# Patient Record
Sex: FEMALE | Race: WHITE | NOT HISPANIC OR LATINO | Employment: FULL TIME | URBAN - METROPOLITAN AREA
[De-identification: names, ages, dates, MRNs, and addresses within clinical notes are randomized per-mention and may not be internally consistent; named-entity substitution may affect disease eponyms.]

---

## 2017-01-09 ENCOUNTER — GENERIC CONVERSION - ENCOUNTER (OUTPATIENT)
Dept: OTHER | Facility: OTHER | Age: 57
End: 2017-01-09

## 2017-01-27 ENCOUNTER — ALLSCRIPTS OFFICE VISIT (OUTPATIENT)
Dept: OTHER | Facility: OTHER | Age: 57
End: 2017-01-27

## 2017-01-27 DIAGNOSIS — M25.511 PAIN IN RIGHT SHOULDER: ICD-10-CM

## 2017-02-21 ENCOUNTER — TRANSCRIBE ORDERS (OUTPATIENT)
Dept: ADMINISTRATIVE | Facility: HOSPITAL | Age: 57
End: 2017-02-21

## 2017-02-21 ENCOUNTER — HOSPITAL ENCOUNTER (OUTPATIENT)
Dept: MRI IMAGING | Facility: HOSPITAL | Age: 57
Discharge: HOME | End: 2017-02-21
Attending: ORTHOPAEDIC SURGERY
Payer: COMMERCIAL

## 2017-02-21 DIAGNOSIS — M25.511 PAIN IN RIGHT SHOULDER: ICD-10-CM

## 2017-02-21 PROCEDURE — 73221 MRI JOINT UPR EXTREM W/O DYE: CPT

## 2017-03-06 ENCOUNTER — ALLSCRIPTS OFFICE VISIT (OUTPATIENT)
Dept: OTHER | Facility: OTHER | Age: 57
End: 2017-03-06

## 2017-11-16 ENCOUNTER — TRANSCRIBE ORDERS (OUTPATIENT)
Dept: ADMINISTRATIVE | Facility: HOSPITAL | Age: 57
End: 2017-11-16

## 2017-11-16 DIAGNOSIS — Z12.31 VISIT FOR SCREENING MAMMOGRAM: Primary | ICD-10-CM

## 2017-11-24 ENCOUNTER — HOSPITAL ENCOUNTER (OUTPATIENT)
Dept: RADIOLOGY | Facility: HOSPITAL | Age: 57
Discharge: HOME | End: 2017-11-24
Attending: OBSTETRICS & GYNECOLOGY
Payer: COMMERCIAL

## 2017-11-24 DIAGNOSIS — Z12.31 VISIT FOR SCREENING MAMMOGRAM: ICD-10-CM

## 2017-11-24 PROCEDURE — G0202 SCR MAMMO BI INCL CAD: HCPCS

## 2018-01-11 NOTE — MISCELLANEOUS
Message   Recorded as Task   Date: 10/14/2016 11:26 AM, Created By: Emilie Mota   Task Name: Miscellaneous   Assigned To: Suzie Ledezma   Regarding Patient: Quinn Murphy, Status: Active   CommentGregg Nair - 14 Oct 2016 11:26 AM     TASK CREATED  Jayda returned your call and said she is unable to move her appointment  Please call her back [phone] or [phone]   Pinky Bean - 17 Oct 2016 11:17 AM     TASK REPLIED TO: Previously Assigned To Pinky Bean  patient has been rescheduled  Thank you        Active Problems    1  Acute bronchitis (466 0) (J20 9)   2  Allergic rhinitis (477 9) (J30 9)   3  Anal fissure (565 0) (K60 2)   4  Anemia (285 9) (D64 9)   5  Anxiety disorder (300 00) (F41 9)   6  Arthralgia (719 40)   7  Arthritis (716 90) (M19 90)   8  Arthropathy (716 90) (M12 9)   9  Cervical disc disorder with radiculopathy of mid-cervical region (723 4) (M50 120)   10  Cholelithiasis (574 20) (K80 20)   11  Chronic neck pain (723 1,338 29) (M54 2,G89 29)   12  Chronic pain syndrome (338 4) (G89 4)   13  Chronic sinusitis (473 9) (J32 9)   14  Conjunctivitis (372 30) (H10 9)   15  Cough (786 2) (R05)   16  Diarrhea (787 91) (R19 7)   17  Difficulty breathing (786 09) (R06 89)   18  Dizziness (780 4) (R42)   19  Encounter for routine gynecological examination with Papanicolaou smear of cervix    (V72 31,V76 2) (Z01 419)   20  Encounter for screening mammogram for malignant neoplasm of breast (V76 12)    (Z12 31)   21  Endometriosis (617 9) (N80 9)   22  External Hemorrhoids (455 3)   23  Fatigue (780 79) (R53 83)   24  Fatigue (780 79) (R53 83)   25  Fibromyalgia (729 1) (M79 7)   26  Headache (784 0) (R51)   27  Heart burn (787 1) (R12)   28  Hemorrhoids (455 6) (K64 9)   29  Hyperlipidemia (272 4) (E78 5)   30  Impaired fasting glucose (790 21) (R73 01)   31  Impingement syndrome of right shoulder (726 2) (M75 41)   32  Malaise (780 79) (R53 81)   33   Multiple joint pain (719 49) (M25 50)   34  Multiple joint pain (719 49) (M25 50)   35  Myalgia And Myositis (729 1)   36  History of Neuroplasty Decompression Median Nerve At Carpal Tunnel   37  Nicotine dependence (305 1) (F17 200)   38  Osteopenia (733 90) (M85 80)   39  Pruritus ani (698 0) (L29 0)   40  Right shoulder pain (719 41) (M25 511)   41  Screening for diabetes mellitus (V77 1) (Z13 1)   42  Screening for osteoporosis (V82 81) (Z13 820)   43  Screening for thyroid disorder (V77 0) (Z13 29)   44  Sinusitis (473 9) (J32 9)   45  Subacromial bursitis, right (726 19) (M75 51)   46  Urinary tract infection (599 0) (N39 0)   47  Vitamin D deficiency (268 9) (E55 9)    Current Meds   1  Fluconazole 150 MG Oral Tablet; TAKE 1 TABLET DAILY; Therapy: 12ULD1998 to (Evaluate:90Ceo3723)  Requested for: 59Cvo6048; Last   Rx:60Xlj4797 Ordered   2  Fluticasone Propionate 50 MCG/ACT Nasal Suspension; USE 2 SPRAYS IN EACH   NOSTRIL ONCE DAILY; Therapy: 81OGN9980 to (Evaluate:16Mar2015)  Requested for: 94HUF6051; Last   Rx:12Akc0841 Ordered   3  Ibuprofen 200 MG Oral Capsule; TAKE 1 CAPSULE EVERY 4 TO 6 HOURS PRN   Recorded   4  Naproxen 500 MG Oral Tablet (Naprosyn); TAKE 1 TABLET EVERY 12 HOURS DAILY    Requested for: 91CAM4520; Last Rx:27Mar2015 Ordered   5  Rizatriptan Benzoate 10 MG Oral Tablet (Maxalt); TAKE ONE TABLET BY MOUTH AT   ONSET OF HEADACHE REPEAT IN 2 HOURS AS NEEDED MAXIMUM OF 2 DAILY; Therapy: 79XJR7793 to (Evaluate:24Jan2016)  Requested for: 08NOU0158; Last   Rx:26Oct2015 Ordered    Allergies    1  CeleBREX CAPS    2  Seasonal    Signatures   Electronically signed by :  Lluvia Sims, ; Oct 17 2016 12:26PM EST                       (Author)

## 2018-01-12 NOTE — MISCELLANEOUS
Message   Recorded as Task   Date: 01/04/2017 01:43 PM, Created By: Elvin Alvarado   Task Name: Follow Up   Assigned To: [address] Procedure,Team   Regarding Patient: Naomi Hampton, Status: Active   Comment:    Marcie Aldana - 04 Jan 2017 1:43 PM     TASK CREATED  L/m to call  S/p C7-T1 ROSI done 12/29/16 by Dr Gerianne Goltz Rivera,Jully - 09 Jan 2017 11:55 AM     TASK EDITED  Patient reports she received 80% relief from her procedure  Her pain level today is a 2  S/p C7-T1-ROSI done 12/29/16  Clearnce Lei - 09 Jan 2017 11:58 AM     TASK REPLIED TO: Previously Assigned To [address] Procedure,Team  Aware  Thanks  Active Problems    1  Acute bronchitis (466 0) (J20 9)   2  Allergic rhinitis (477 9) (J30 9)   3  Anal fissure (565 0) (K60 2)   4  Anemia (285 9) (D64 9)   5  Anxiety disorder (300 00) (F41 9)   6  Arthralgia (719 40)   7  Arthritis (716 90) (M19 90)   8  Arthropathy (716 90) (M12 9)   9  Cervical disc disorder with radiculopathy of mid-cervical region (723 4) (M50 120)   10  Cholelithiasis (574 20) (K80 20)   11  Chronic neck pain (723 1,338 29) (M54 2,G89 29)   12  Chronic pain syndrome (338 4) (G89 4)   13  Chronic sinusitis (473 9) (J32 9)   14  Conjunctivitis (372 30) (H10 9)   15  Cough (786 2) (R05)   16  Diarrhea (787 91) (R19 7)   17  Difficulty breathing (786 09) (R06 89)   18  Dizziness (780 4) (R42)   19  Encounter for routine gynecological examination with Papanicolaou smear of cervix    (V72 31,V76 2) (Z01 419)   20  Encounter for screening mammogram for malignant neoplasm of breast (V76 12)    (Z12 31)   21  Endometriosis (617 9) (N80 9)   22  External Hemorrhoids (455 3)   23  Fatigue (780 79) (R53 83)   24  Fatigue (780 79) (R53 83)   25  Fibromyalgia (729 1) (M79 7)   26  Headache (784 0) (R51)   27  Heart burn (787 1) (R12)   28  Hemorrhoids (455 6) (K64 9)   29  Hyperlipidemia (272 4) (E78 5)   30  Impaired fasting glucose (790 21) (R73 01)   31   Impingement syndrome of right shoulder (726 2) (M75 41)   32  Malaise (780 79) (R53 81)   33  Multiple joint pain (719 49) (M25 50)   34  Multiple joint pain (719 49) (M25 50)   35  Myalgia And Myositis (729 1)   36  History of Neuroplasty Decompression Median Nerve At Carpal Tunnel   37  Nicotine dependence (305 1) (F17 200)   38  Osteopenia (733 90) (M85 80)   39  Pruritus ani (698 0) (L29 0)   40  Right shoulder pain (719 41) (M25 511)   41  Screening for diabetes mellitus (V77 1) (Z13 1)   42  Screening for osteoporosis (V82 81) (Z13 820)   43  Screening for thyroid disorder (V77 0) (Z13 29)   44  Sinusitis (473 9) (J32 9)   45  Subacromial bursitis, right (726 19) (M75 51)   46  Urinary tract infection (599 0) (N39 0)   47  Vitamin D deficiency (268 9) (E55 9)    Current Meds   1  Fluconazole 150 MG Oral Tablet; TAKE 1 TABLET DAILY; Therapy: 66PDX6859 to (Evaluate:92Zce0275)  Requested for: 69Qkm2426; Last   Rx:55Ihf1106 Ordered   2  Fluticasone Propionate 50 MCG/ACT Nasal Suspension; USE 2 SPRAYS IN EACH   NOSTRIL ONCE DAILY; Therapy: 12YQG3194 to (Evaluate:16Mar2015)  Requested for: 66BQL1083; Last   Rx:72Jgd8078 Ordered   3  Ibuprofen 200 MG Oral Capsule; TAKE 1 CAPSULE EVERY 4 TO 6 HOURS PRN   Recorded   4  Naproxen 500 MG Oral Tablet (Naprosyn); TAKE 1 TABLET EVERY 12 HOURS DAILY    Requested for: 57TGW4781; Last Rx:27Mar2015 Ordered   5  Rizatriptan Benzoate 10 MG Oral Tablet (Maxalt); TAKE ONE TABLET BY MOUTH AT   ONSET OF HEADACHE REPEAT IN 2 HOURS AS NEEDED MAXIMUM OF 2 DAILY; Therapy: 21NJB7221 to (Evaluate:24Jan2016)  Requested for: 90YZP4816; Last   Rx:26Oct2015 Ordered    Allergies    1   CeleBREX CAPS    2  Seasonal    Signatures   Electronically signed by : Ashley Islas MA; Jan 9 2017  3:53PM EST                       (Author)

## 2018-01-13 NOTE — MISCELLANEOUS
Message   Recorded as Task   Date: 11/09/2016 01:44 PM, Created By: Toma Prado   Task Name: Follow Up   Assigned To: Rody Bowens   Regarding Patient: Thony Johnson, Status: In Progress   Comment:    Marcie Aldana - 09 Nov 2016 1:44 PM     TASK CREATED  L/m to return call  S/p C7-T1-ROSI done 11/2/16 By Marcie Nam - 10 Nov 2016 1:47 PM     TASK EDITED  Patrient l/m stating that she received 50% relief fromher procedue  She would like to repeat the injection  S/p  C7-T1-ROSI done 11/2/16  Chava Johnson - 10 Nov 2016 2:31 PM     TASK REPLIED TO: Previously Assigned To Chava José Miguelon  Please have patient come in for a repeat procedure at least 3 weeks from the date of the last procedure  Marcie Aldana - 16 Nov 2016 2:28 PM     TASK EDITED     L/m to return call  S/p  C7-T1-ROSI done 11/2/16 by Marcie Nam - 16 Nov 2016 3:04 PM     TASK EDITED   Marcie Aldana - 23 Nov 2016 2:43 PM     TASK EDITED  Please mail letter  Letter Sent      Active Problems    1  Acute bronchitis (466 0) (J20 9)   2  Allergic rhinitis (477 9) (J30 9)   3  Anal fissure (565 0) (K60 2)   4  Anemia (285 9) (D64 9)   5  Anxiety disorder (300 00) (F41 9)   6  Arthralgia (719 40)   7  Arthritis (716 90) (M19 90)   8  Arthropathy (716 90) (M12 9)   9  Cervical disc disorder with radiculopathy of mid-cervical region (723 4) (M50 120)   10  Cholelithiasis (574 20) (K80 20)   11  Chronic neck pain (723 1,338 29) (M54 2,G89 29)   12  Chronic pain syndrome (338 4) (G89 4)   13  Chronic sinusitis (473 9) (J32 9)   14  Conjunctivitis (372 30) (H10 9)   15  Cough (786 2) (R05)   16  Diarrhea (787 91) (R19 7)   17  Difficulty breathing (786 09) (R06 89)   18  Dizziness (780 4) (R42)   19  Encounter for routine gynecological examination with Papanicolaou smear of cervix    (V72 31,V76 2) (Z01 419)   20  Encounter for screening mammogram for malignant neoplasm of breast (V76 12)    (Z12 31)   21   Endometriosis (617 9) (N80 9)   22  External Hemorrhoids (455 3)   23  Fatigue (780 79) (R53 83)   24  Fatigue (780 79) (R53 83)   25  Fibromyalgia (729 1) (M79 7)   26  Headache (784 0) (R51)   27  Heart burn (787 1) (R12)   28  Hemorrhoids (455 6) (K64 9)   29  Hyperlipidemia (272 4) (E78 5)   30  Impaired fasting glucose (790 21) (R73 01)   31  Impingement syndrome of right shoulder (726 2) (M75 41)   32  Malaise (780 79) (R53 81)   33  Multiple joint pain (719 49) (M25 50)   34  Multiple joint pain (719 49) (M25 50)   35  Myalgia And Myositis (729 1)   36  History of Neuroplasty Decompression Median Nerve At Carpal Tunnel   37  Nicotine dependence (305 1) (F17 200)   38  Osteopenia (733 90) (M85 80)   39  Pruritus ani (698 0) (L29 0)   40  Right shoulder pain (719 41) (M25 511)   41  Screening for diabetes mellitus (V77 1) (Z13 1)   42  Screening for osteoporosis (V82 81) (Z13 820)   43  Screening for thyroid disorder (V77 0) (Z13 29)   44  Sinusitis (473 9) (J32 9)   45  Subacromial bursitis, right (726 19) (M75 51)   46  Urinary tract infection (599 0) (N39 0)   47  Vitamin D deficiency (268 9) (E55 9)    Current Meds   1  Fluconazole 150 MG Oral Tablet; TAKE 1 TABLET DAILY; Therapy: 19ZOJ3242 to (Evaluate:62Aql9558)  Requested for: 84Jeg1973; Last   Rx:87Hxg3025 Ordered   2  Fluticasone Propionate 50 MCG/ACT Nasal Suspension; USE 2 SPRAYS IN EACH   NOSTRIL ONCE DAILY; Therapy: 72VQF6910 to (Evaluate:16Mar2015)  Requested for: 24UFI6665; Last   Rx:95Uyk2961 Ordered   3  Ibuprofen 200 MG Oral Capsule; TAKE 1 CAPSULE EVERY 4 TO 6 HOURS PRN   Recorded   4  Naproxen 500 MG Oral Tablet (Naprosyn); TAKE 1 TABLET EVERY 12 HOURS DAILY    Requested for: 27VYK2132; Last Rx:27Mar2015 Ordered   5  Rizatriptan Benzoate 10 MG Oral Tablet (Maxalt); TAKE ONE TABLET BY MOUTH AT   ONSET OF HEADACHE REPEAT IN 2 HOURS AS NEEDED MAXIMUM OF 2 DAILY; Therapy: 74JXL8454 to (Evaluate:24Jan2016)  Requested for: 00MWT2073;  Last   Rx:26Oct2015 Ordered    Allergies    1   CeleBREX CAPS    2  Seasonal    Signatures   Electronically signed by : Erich Balderrama, ; Nov 30 2016 10:13AM EST                       (Author)

## 2018-06-28 ENCOUNTER — HOSPITAL ENCOUNTER (OUTPATIENT)
Dept: RADIOLOGY | Facility: HOSPITAL | Age: 58
Discharge: HOME | End: 2018-06-28
Attending: FAMILY MEDICINE
Payer: COMMERCIAL

## 2018-06-28 ENCOUNTER — TRANSCRIBE ORDERS (OUTPATIENT)
Dept: ADMINISTRATIVE | Facility: HOSPITAL | Age: 58
End: 2018-06-28

## 2018-06-28 DIAGNOSIS — M25.50 PAIN IN JOINT, MULTIPLE SITES: Primary | ICD-10-CM

## 2018-06-28 DIAGNOSIS — M25.50 PAIN IN JOINT, MULTIPLE SITES: ICD-10-CM

## 2018-06-28 PROCEDURE — 73502 X-RAY EXAM HIP UNI 2-3 VIEWS: CPT

## 2018-07-06 ENCOUNTER — OFFICE VISIT (OUTPATIENT)
Dept: OBGYN CLINIC | Facility: CLINIC | Age: 58
End: 2018-07-06
Payer: COMMERCIAL

## 2018-07-06 VITALS
BODY MASS INDEX: 24.33 KG/M2 | WEIGHT: 155 LBS | HEIGHT: 67 IN | HEART RATE: 64 BPM | DIASTOLIC BLOOD PRESSURE: 65 MMHG | SYSTOLIC BLOOD PRESSURE: 103 MMHG

## 2018-07-06 DIAGNOSIS — M25.551 RIGHT HIP PAIN: ICD-10-CM

## 2018-07-06 DIAGNOSIS — M70.61 TROCHANTERIC BURSITIS OF RIGHT HIP: Primary | ICD-10-CM

## 2018-07-06 PROCEDURE — 99214 OFFICE O/P EST MOD 30 MIN: CPT | Performed by: ORTHOPAEDIC SURGERY

## 2018-07-06 RX ORDER — RIZATRIPTAN BENZOATE 10 MG/1
10 TABLET ORAL
COMMUNITY

## 2018-07-06 NOTE — PROGRESS NOTES
Assessment/Plan:  1  Trochanteric bursitis of right hip  Ambulatory referral to Physical Therapy    diclofenac sodium (VOLTAREN) 1 %   2  Right hip pain  Ambulatory referral to Physical Therapy    diclofenac sodium (VOLTAREN) 1 %     Jayda is a very pleasant 81-year-old female presenting for evaluation of right hip pain consistent with greater trochanteric bursitis  She does not have significant arthritis of her hip on x-ray and is asymptomatic  She does have lumbar spine spondylosis, but focal pain over the greater trochanter and IT band consistent with trochanteric bursitis  We discussed these findings with her and have recommended conservative treatment  She was referred to formal physical therapy and given a prescription for Voltaren gel that she can use up to 4 times a day on the affected hip  She does not have a true allergy to Celebrex and has been able to tolerate Naprosyn  However, she was educated about potential side effects of Voltaren gel and that she should discontinue if she begins to notice palpitations after using it  We would like to see her back in 6 weeks for clinical re-evaluation  She will not need new x-rays at that time  All of her questions were answered in the office today  Subjective: Right hip pain    Patient ID: Opal Cespedes is a 63 y o  female  Meagan Martinez is a very pleasant 81-year-old female presenting for 3 months of atraumatic right hip pain  She does have a history of cervical and lumbar spine degeneration for which she sees a chiropractor  However, she has noted lateral right hip pain without any groin pain or paresthesias in the lower extremities  She denies any injury history to the right hip  She does work as a  and does a significant amount of driving and exercise in her free time  She has tried an occasional Naprosyn, which has provided some relief  She denies any exacerbating or alleviating factors  She has tried ice but no Tylenol    She is tender at a cortisone injection or physical therapy for this hip  She does occasionally feel pain radiating down the lateral right leg towards the knee  Overall, she is in good health and lives an active lifestyle  Review of Systems   Constitutional: Negative  HENT: Negative  Eyes: Negative  Respiratory: Negative  Cardiovascular: Negative  Gastrointestinal: Negative  Endocrine: Negative  Genitourinary: Positive for hematuria  Musculoskeletal: Positive for arthralgias and myalgias  Skin: Positive for rash  Allergic/Immunologic: Negative  Neurological: Negative  Hematological: Negative  Psychiatric/Behavioral: Positive for decreased concentration  The patient is nervous/anxious  Past Medical History:   Diagnosis Date    Neck pain     Osteopenia     Seasonal allergic reaction        Past Surgical History:   Procedure Laterality Date    CHOLECYSTECTOMY      laparoscopy    EPIDURAL BLOCK INJECTION N/A 12/29/2016    Procedure: BLOCK / INJECTION EPIDURAL STEROID CERVICAL  C7-T1;  Surgeon: Srinivas Echevarria MD;  Location: [facility] MAIN OR;  Service:     EPIDURAL BLOCK INJECTION N/A 11/2/2016    Procedure: BLOCK / INJECTION EPIDURAL STEROID CERVICAL  C7-T1;  Surgeon: Srinivas Echevarria MD;  Location: [facility] MAIN OR;  Service:     FRACTURE SURGERY      right ankle    HYSTERECTOMY         History reviewed  No pertinent family history  Social History     Occupational History    Not on file       Social History Main Topics    Smoking status: Current Every Day Smoker     Packs/day: 0 25     Years: 40 00    Smokeless tobacco: Never Used    Alcohol use Yes      Comment: social    Drug use: No    Sexual activity: Not on file         Current Outpatient Prescriptions:     naproxen (NAPROSYN) 500 mg tablet, Take 500 mg by mouth 2 (two) times a day with meals, Disp: , Rfl:     rizatriptan (MAXALT) 10 MG tablet, Take 10 mg by mouth, Disp: , Rfl:     diclofenac sodium (VOLTAREN) 1 %, Apply 2 g topically 4 (four) times a day, Disp: 1 Tube, Rfl: 1    Allergies   Allergen Reactions    Percocet [Oxycodone-Acetaminophen] GI Intolerance    Celecoxib Palpitations       Objective:  Vitals:    07/06/18 0815   BP: 103/65   Pulse: 64       Body mass index is 24 28 kg/m²  Right Hip Exam     Tenderness   The patient is experiencing tenderness in the greater trochanter and lateral     Range of Motion   Extension:  20 normal   Flexion:  130 normal   Internal Rotation:  25 normal   External Rotation:  50 normal   Abduction:  45 normal   Adduction:  25 normal     Muscle Strength   Abduction: 5/5   Adduction: 5/5   Flexion: 5/5     Tests   JOJO: negative  Aden: positive    Other   Erythema: absent  Scars: absent  Sensation: normal  Pulse: present    Comments:  Negative Stingefiel/Impingement/FABERs/log roll  No tenderness lumbar spinous processes or paraspinal muscles  Negative SLR seated and supine bilaterally  Normal sensation to light touch L2-S1  Ambulates with normal, symmetrical gait  Mild tenderness IT band  Positive Aden's  Calf and thigh soft and non tender            Physical Exam   Constitutional: She is oriented to person, place, and time  She appears well-developed and well-nourished  Body mass index is 24 28 kg/m²  HENT:   Head: Normocephalic and atraumatic  Eyes: EOM are normal    Neck: Normal range of motion  Cardiovascular: Intact distal pulses  Pulmonary/Chest: Effort normal    Musculoskeletal:   See ortho exam   Neurological: She is alert and oriented to person, place, and time  Skin: Skin is warm and dry  Psychiatric: She has a normal mood and affect  Her behavior is normal  Judgment and thought content normal        I have personally reviewed pertinent films in PACS of the weightbearing X-rays of her pelvis and right hip which show mild narrowing of the femoroacetabular joint on the right  There is no sclerosis, subchondral cyst, fracture, dislocation, or calcification  What's seen of the lumbar spine demonstrates significant spondylosis at L5-S1 and mild at L4-L5

## 2018-09-18 ENCOUNTER — TRANSCRIBE ORDERS (OUTPATIENT)
Dept: ADMINISTRATIVE | Facility: HOSPITAL | Age: 58
End: 2018-09-18

## 2018-09-18 ENCOUNTER — APPOINTMENT (OUTPATIENT)
Dept: LAB | Facility: HOSPITAL | Age: 58
End: 2018-09-18
Attending: FAMILY MEDICINE
Payer: COMMERCIAL

## 2018-09-18 ENCOUNTER — HOSPITAL ENCOUNTER (OUTPATIENT)
Dept: RADIOLOGY | Facility: HOSPITAL | Age: 58
Discharge: HOME | End: 2018-09-18
Attending: FAMILY MEDICINE
Payer: COMMERCIAL

## 2018-09-18 DIAGNOSIS — J02.9 ACUTE PHARYNGITIS, UNSPECIFIED ETIOLOGY: Primary | ICD-10-CM

## 2018-09-18 DIAGNOSIS — R05.9 COUGH: ICD-10-CM

## 2018-09-18 DIAGNOSIS — M25.50 PAIN IN JOINT, MULTIPLE SITES: ICD-10-CM

## 2018-09-18 DIAGNOSIS — R06.02 SOB (SHORTNESS OF BREATH): ICD-10-CM

## 2018-09-18 DIAGNOSIS — J02.9 ACUTE PHARYNGITIS, UNSPECIFIED ETIOLOGY: ICD-10-CM

## 2018-09-18 LAB
BASOPHILS # BLD AUTO: 0.09 THOUSANDS/ΜL (ref 0–0.1)
BASOPHILS NFR BLD AUTO: 2 % (ref 0–1)
EOSINOPHIL # BLD AUTO: 0 THOUSAND/ΜL (ref 0–0.61)
EOSINOPHIL NFR BLD AUTO: 0 % (ref 0–6)
ERYTHROCYTE [DISTWIDTH] IN BLOOD BY AUTOMATED COUNT: 11.5 % (ref 11.6–15.1)
ERYTHROCYTE [SEDIMENTATION RATE] IN BLOOD: 8 MM/HOUR (ref 2–25)
HCT VFR BLD AUTO: 39.4 % (ref 34.8–46.1)
HGB BLD-MCNC: 12.8 G/DL (ref 11.5–15.4)
IMM GRANULOCYTES # BLD AUTO: 0.02 THOUSAND/UL (ref 0–0.2)
IMM GRANULOCYTES NFR BLD AUTO: 0 % (ref 0–2)
LYMPHOCYTES # BLD AUTO: 2.06 THOUSANDS/ΜL (ref 0.6–4.47)
LYMPHOCYTES NFR BLD AUTO: 33 % (ref 14–44)
MCH RBC QN AUTO: 30.3 PG (ref 26.8–34.3)
MCHC RBC AUTO-ENTMCNC: 32.5 G/DL (ref 31.4–37.4)
MCV RBC AUTO: 93 FL (ref 82–98)
MONOCYTES # BLD AUTO: 0.51 THOUSAND/ΜL (ref 0.17–1.22)
MONOCYTES NFR BLD AUTO: 8 % (ref 4–12)
NEUTROPHILS # BLD AUTO: 3.5 THOUSANDS/ΜL (ref 1.85–7.62)
NEUTS SEG NFR BLD AUTO: 57 % (ref 43–75)
NRBC BLD AUTO-RTO: 0 /100 WBCS
PLATELET # BLD AUTO: 243 THOUSANDS/UL (ref 149–390)
PMV BLD AUTO: 10.4 FL (ref 8.9–12.7)
RBC # BLD AUTO: 4.23 MILLION/UL (ref 3.81–5.12)
WBC # BLD AUTO: 6.18 THOUSAND/UL (ref 4.31–10.16)

## 2018-09-18 PROCEDURE — 85652 RBC SED RATE AUTOMATED: CPT

## 2018-09-18 PROCEDURE — 85025 COMPLETE CBC W/AUTO DIFF WBC: CPT | Performed by: FAMILY MEDICINE

## 2018-09-18 PROCEDURE — 86664 EPSTEIN-BARR NUCLEAR ANTIGEN: CPT

## 2018-09-18 PROCEDURE — 36415 COLL VENOUS BLD VENIPUNCTURE: CPT | Performed by: FAMILY MEDICINE

## 2018-09-18 PROCEDURE — 86663 EPSTEIN-BARR ANTIBODY: CPT

## 2018-09-18 PROCEDURE — 71046 X-RAY EXAM CHEST 2 VIEWS: CPT

## 2018-09-18 PROCEDURE — 86665 EPSTEIN-BARR CAPSID VCA: CPT

## 2018-09-18 PROCEDURE — 87147 CULTURE TYPE IMMUNOLOGIC: CPT

## 2018-09-18 PROCEDURE — 87070 CULTURE OTHR SPECIMN AEROBIC: CPT

## 2018-09-18 PROCEDURE — 86618 LYME DISEASE ANTIBODY: CPT

## 2018-09-19 LAB
B BURGDOR IGG SER IA-ACNC: 0.27
B BURGDOR IGM SER IA-ACNC: 0.46
EBV EA IGG SER-ACNC: >150 U/ML (ref 0–8.9)
EBV NA IGG SER IA-ACNC: 317 U/ML (ref 0–17.9)
EBV PATRN SPEC IB-IMP: ABNORMAL
EBV VCA IGG SER IA-ACNC: 296 U/ML (ref 0–17.9)
EBV VCA IGM SER IA-ACNC: <36 U/ML (ref 0–35.9)

## 2018-09-20 LAB — BACTERIA THROAT CULT: ABNORMAL

## 2018-11-01 ENCOUNTER — TRANSCRIBE ORDERS (OUTPATIENT)
Dept: ADMINISTRATIVE | Facility: HOSPITAL | Age: 58
End: 2018-11-01

## 2018-11-01 DIAGNOSIS — Z12.39 SCREENING BREAST EXAMINATION: Primary | ICD-10-CM

## 2018-11-02 ENCOUNTER — TRANSCRIBE ORDERS (OUTPATIENT)
Dept: ADMINISTRATIVE | Facility: HOSPITAL | Age: 58
End: 2018-11-02

## 2018-11-02 DIAGNOSIS — M81.0 OSTEOPOROSIS, POSTMENOPAUSAL: Primary | ICD-10-CM

## 2018-11-27 ENCOUNTER — HOSPITAL ENCOUNTER (OUTPATIENT)
Dept: RADIOLOGY | Facility: HOSPITAL | Age: 58
Discharge: HOME | End: 2018-11-27
Attending: OBSTETRICS & GYNECOLOGY
Payer: COMMERCIAL

## 2018-11-27 ENCOUNTER — HOSPITAL ENCOUNTER (OUTPATIENT)
Dept: RADIOLOGY | Facility: HOSPITAL | Age: 58
Discharge: HOME | End: 2018-11-27
Attending: FAMILY MEDICINE
Payer: COMMERCIAL

## 2018-11-27 VITALS — BODY MASS INDEX: 24.01 KG/M2 | WEIGHT: 153 LBS | HEIGHT: 67 IN

## 2018-11-27 DIAGNOSIS — Z12.39 SCREENING BREAST EXAMINATION: ICD-10-CM

## 2018-11-27 DIAGNOSIS — M81.0 OSTEOPOROSIS, POSTMENOPAUSAL: ICD-10-CM

## 2018-11-27 PROCEDURE — 77080 DXA BONE DENSITY AXIAL: CPT

## 2018-11-27 PROCEDURE — 77067 SCR MAMMO BI INCL CAD: CPT

## 2019-12-05 ENCOUNTER — TRANSCRIBE ORDERS (OUTPATIENT)
Dept: ADMINISTRATIVE | Facility: HOSPITAL | Age: 59
End: 2019-12-05

## 2019-12-05 DIAGNOSIS — Z12.31 SCREENING MAMMOGRAM FOR HIGH-RISK PATIENT: Primary | ICD-10-CM

## 2020-01-07 ENCOUNTER — TRANSCRIBE ORDERS (OUTPATIENT)
Dept: ADMINISTRATIVE | Facility: HOSPITAL | Age: 60
End: 2020-01-07

## 2020-01-07 DIAGNOSIS — I67.1 INTRACRANIAL ANEURYSM: ICD-10-CM

## 2020-01-07 DIAGNOSIS — G89.29 CHRONIC NONINTRACTABLE HEADACHE, UNSPECIFIED HEADACHE TYPE: Primary | ICD-10-CM

## 2020-01-07 DIAGNOSIS — R51.9 CHRONIC NONINTRACTABLE HEADACHE, UNSPECIFIED HEADACHE TYPE: Primary | ICD-10-CM

## 2020-01-18 ENCOUNTER — HOSPITAL ENCOUNTER (OUTPATIENT)
Dept: MRI IMAGING | Facility: HOSPITAL | Age: 60
Discharge: HOME | End: 2020-01-18
Payer: COMMERCIAL

## 2020-01-18 DIAGNOSIS — G89.29 CHRONIC NONINTRACTABLE HEADACHE, UNSPECIFIED HEADACHE TYPE: ICD-10-CM

## 2020-01-18 DIAGNOSIS — I67.1 INTRACRANIAL ANEURYSM: ICD-10-CM

## 2020-01-18 DIAGNOSIS — R51.9 CHRONIC NONINTRACTABLE HEADACHE, UNSPECIFIED HEADACHE TYPE: ICD-10-CM

## 2020-01-18 PROCEDURE — 76377 3D RENDER W/INTRP POSTPROCES: CPT

## 2020-01-18 PROCEDURE — 70551 MRI BRAIN STEM W/O DYE: CPT

## 2020-01-30 ENCOUNTER — HOSPITAL ENCOUNTER (OUTPATIENT)
Dept: RADIOLOGY | Facility: HOSPITAL | Age: 60
Discharge: HOME | End: 2020-01-30
Attending: FAMILY MEDICINE
Payer: COMMERCIAL

## 2020-01-30 VITALS — WEIGHT: 160 LBS | HEIGHT: 67 IN | BODY MASS INDEX: 25.11 KG/M2

## 2020-01-30 DIAGNOSIS — Z12.31 SCREENING MAMMOGRAM FOR HIGH-RISK PATIENT: ICD-10-CM

## 2020-01-30 PROCEDURE — 77063 BREAST TOMOSYNTHESIS BI: CPT

## 2020-01-30 PROCEDURE — 77067 SCR MAMMO BI INCL CAD: CPT

## 2020-08-06 ENCOUNTER — TELEPHONE (OUTPATIENT)
Dept: OBGYN CLINIC | Facility: CLINIC | Age: 60
End: 2020-08-06

## 2020-08-06 ENCOUNTER — OFFICE VISIT (OUTPATIENT)
Dept: OBGYN CLINIC | Facility: CLINIC | Age: 60
End: 2020-08-06
Payer: COMMERCIAL

## 2020-08-06 ENCOUNTER — APPOINTMENT (OUTPATIENT)
Dept: RADIOLOGY | Facility: CLINIC | Age: 60
End: 2020-08-06
Payer: COMMERCIAL

## 2020-08-06 VITALS
TEMPERATURE: 97 F | BODY MASS INDEX: 25.24 KG/M2 | WEIGHT: 160.8 LBS | HEIGHT: 67 IN | DIASTOLIC BLOOD PRESSURE: 65 MMHG | HEART RATE: 64 BPM | SYSTOLIC BLOOD PRESSURE: 100 MMHG

## 2020-08-06 DIAGNOSIS — M65.331 TRIGGER MIDDLE FINGER OF RIGHT HAND: ICD-10-CM

## 2020-08-06 DIAGNOSIS — M79.641 HAND PAIN, RIGHT: ICD-10-CM

## 2020-08-06 DIAGNOSIS — M18.0 ARTHRITIS OF CARPOMETACARPAL (CMC) JOINT OF BOTH THUMBS: ICD-10-CM

## 2020-08-06 DIAGNOSIS — M18.0 ARTHRITIS OF CARPOMETACARPAL (CMC) JOINT OF BOTH THUMBS: Primary | ICD-10-CM

## 2020-08-06 PROCEDURE — 73140 X-RAY EXAM OF FINGER(S): CPT

## 2020-08-06 PROCEDURE — 73130 X-RAY EXAM OF HAND: CPT

## 2020-08-06 PROCEDURE — 20600 DRAIN/INJ JOINT/BURSA W/O US: CPT | Performed by: ORTHOPAEDIC SURGERY

## 2020-08-06 PROCEDURE — 20550 NJX 1 TENDON SHEATH/LIGAMENT: CPT | Performed by: ORTHOPAEDIC SURGERY

## 2020-08-06 PROCEDURE — 99204 OFFICE O/P NEW MOD 45 MIN: CPT | Performed by: ORTHOPAEDIC SURGERY

## 2020-08-06 RX ORDER — LIDOCAINE HYDROCHLORIDE 5 MG/ML
0.5 INJECTION, SOLUTION INFILTRATION; PERINEURAL
Status: COMPLETED | OUTPATIENT
Start: 2020-08-06 | End: 2020-08-06

## 2020-08-06 RX ORDER — TRIAMCINOLONE ACETONIDE 40 MG/ML
20 INJECTION, SUSPENSION INTRA-ARTICULAR; INTRAMUSCULAR
Status: COMPLETED | OUTPATIENT
Start: 2020-08-06 | End: 2020-08-06

## 2020-08-06 RX ORDER — LINACLOTIDE 72 UG/1
CAPSULE, GELATIN COATED ORAL AS NEEDED
COMMUNITY

## 2020-08-06 RX ADMIN — LIDOCAINE HYDROCHLORIDE 0.5 ML: 5 INJECTION, SOLUTION INFILTRATION; PERINEURAL at 09:04

## 2020-08-06 RX ADMIN — TRIAMCINOLONE ACETONIDE 20 MG: 40 INJECTION, SUSPENSION INTRA-ARTICULAR; INTRAMUSCULAR at 09:03

## 2020-08-06 RX ADMIN — TRIAMCINOLONE ACETONIDE 20 MG: 40 INJECTION, SUSPENSION INTRA-ARTICULAR; INTRAMUSCULAR at 09:04

## 2020-08-06 RX ADMIN — LIDOCAINE HYDROCHLORIDE 0.5 ML: 5 INJECTION, SOLUTION INFILTRATION; PERINEURAL at 09:03

## 2020-08-06 NOTE — TELEPHONE ENCOUNTER
Patient was seen by Dr Shawnee Sanders today to obtain 2 comfort cool braces  I checked with Cherelle our [place] DME fitter and the braces are $34 00 retail  Patient's insurance does not cover  Patient will be coming in for a right comfort cool brace tomorrow

## 2020-08-06 NOTE — PROGRESS NOTES
Assessment/Plan:  1  Arthritis of carpometacarpal (CMC) joint of both thumbs  XR thumb left first digit-min 2v    Small joint arthrocentesis: bilateral thumb CMC   2  Trigger middle finger of right hand  Hand/upper extremity injection: R long A1   3  Hand pain, right  XR hand 3+ vw right       Scribe Attestation    I,:   Arcelia Carrera MA am acting as a scribe while in the presence of the attending physician :        I,:   Papito Hamlin DO personally performed the services described in this documentation    as scribed in my presence :              I discussed with Jayda that her signs and symptoms are consistent with bilateral thumb CMC arthritis  She is tender to palpation over the thumb CMC joint  She does also have signs and symptoms are early trigger finger right long finger  She is tender to palpation over the A1 pulley  Treatment options were discussed in the form of bracing and steroid injections  She was agreeable to this  She consented and underwent bilateral thumb CMC injections and a right long finger trigger finger injection  These were performed in the office today without any complications  She was fitted and provided with bilateral comfort cool braces  She will follow up in 3 months for repeat evaluation  Subjective:   Nils Moritz is a 62 y o  female who presents to the office today for evaluation of right hand pain  Patient states this has been ongoing for the past few months  She notes pain to the base of her thumb  She states this is increased with pinch and   She states this is starting on the left as well  She also notes triggering to her right long finger  She does have a history of right carpal tunnel release  Review of Systems   Constitutional: Negative for chills and fever  HENT: Negative for drooling and sneezing  Eyes: Negative for redness  Respiratory: Negative for cough and wheezing  Gastrointestinal: Negative for nausea and vomiting     Musculoskeletal: Negative for arthralgias, joint swelling and myalgias  Neurological: Negative for weakness and numbness  Psychiatric/Behavioral: Negative for behavioral problems  The patient is not nervous/anxious            Past Medical History:   Diagnosis Date    Neck pain     Osteopenia     Seasonal allergic reaction        Past Surgical History:   Procedure Laterality Date    CHOLECYSTECTOMY      laparoscopy    EPIDURAL BLOCK INJECTION N/A 2016    Procedure: BLOCK / INJECTION EPIDURAL STEROID CERVICAL  C7-T1;  Surgeon: Екатерина Multani MD;  Location: [facility] MAIN OR;  Service:     EPIDURAL BLOCK INJECTION N/A 2016    Procedure: BLOCK / INJECTION EPIDURAL STEROID CERVICAL  C7-T1;  Surgeon: Екатерина Multani MD;  Location: [facility] MAIN OR;  Service:    [address]      right ankle    HYSTERECTOMY      OOPHORECTOMY Bilateral        Family History   Problem Relation Age of Onset    No Known Problems Sister     Lung cancer Maternal Grandmother     Kidney cancer Paternal Grandfather     No Known Problems Paternal Aunt     No Known Problems Maternal Aunt     No Known Problems Maternal Aunt     No Known Problems Paternal Aunt        Social History     Occupational History    Not on file   Tobacco Use    Smoking status: Former Smoker     Packs/day: 0 25     Years: 40 00     Pack years: 10 00     Types: Cigarettes     Last attempt to quit: 2020     Years since quittin 5    Smokeless tobacco: Never Used   Substance and Sexual Activity    Alcohol use: Not Currently     Comment: social    Drug use: No    Sexual activity: Not on file         Current Outpatient Medications:     diclofenac sodium (VOLTAREN) 1 %, Apply 2 g topically 4 (four) times a day, Disp: 1 Tube, Rfl: 1    linaCLOtide (Linzess) 72 MCG CAPS, Take by mouth as needed, Disp: , Rfl:     naproxen (NAPROSYN) 500 mg tablet, Take 500 mg by mouth as needed , Disp: , Rfl:     rizatriptan (MAXALT) 10 MG tablet, Take 10 mg by mouth, Disp: , Rfl:     Allergies   Allergen Reactions    Other      Seasonal allergies    Percocet [Oxycodone-Acetaminophen] GI Intolerance    Celecoxib Palpitations       Objective:  Vitals:    08/06/20 0820   BP: 100/65   Pulse: 64   Temp: (!) 97 °F (36 1 °C)       Ortho Exam     Right hand    TTP A1 pulley long finger   TTP thumb CMC  Compartments soft  Brisk capillary refill  S/m intact median, radial, and ulnar nerve     Left hand    TTP thumb CMC  Compartments soft  Brisk capillary refill  S/m intact median, radial, and ulnar nerve     Physical Exam   Constitutional: She is oriented to person, place, and time  She appears well-developed  HENT:   Head: Normocephalic and atraumatic  Eyes: Conjunctivae are normal  Right eye exhibits no discharge  Left eye exhibits no discharge  Neck: Normal range of motion  Neck supple  Cardiovascular: Normal rate  Pulmonary/Chest: Effort normal  No respiratory distress  Musculoskeletal:      Comments: As noted in HPI   Neurological: She is alert and oriented to person, place, and time  Skin: Skin is warm and dry     Psychiatric: Her behavior is normal  Judgment and thought content normal      Small joint arthrocentesis: bilateral thumb CMC  Date/Time: 8/6/2020 9:03 AM  Consent given by: patient  Site marked: site marked  Timeout: Immediately prior to procedure a time out was called to verify the correct patient, procedure, equipment, support staff and site/side marked as required   Supporting Documentation  Indications: pain   Procedure Details  Location: thumb - bilateral thumb CMC  Preparation: Patient was prepped and draped in the usual sterile fashion  Needle size: 27 G  Ultrasound guidance: no    Medications (Right): 0 5 mL lidocaine 0 5 %; 20 mg triamcinolone acetonide 40 mg/mLMedications (Left): 0 5 mL lidocaine 0 5 %; 20 mg triamcinolone acetonide 40 mg/mL   Patient tolerance: patient tolerated the procedure well with no immediate complications  Dressing: Sterile dressing applied    Hand/upper extremity injection: R long A1  Date/Time: 8/6/2020 9:04 AM  Consent given by: patient  Site marked: site marked  Timeout: Immediately prior to procedure a time out was called to verify the correct patient, procedure, equipment, support staff and site/side marked as required   Supporting Documentation  Indications: pain   Procedure Details  Condition:trigger finger Location: long finger - R long A1   Preparation: Patient was prepped and draped in the usual sterile fashion  Needle size: 27 G  Ultrasound guidance: no  Medications administered: 0 5 mL lidocaine 0 5 %; 20 mg triamcinolone acetonide 40 mg/mL    Patient tolerance: patient tolerated the procedure well with no immediate complications  Dressing:  Sterile dressing applied       I have personally reviewed pertinent films in PACS and my interpretation is as follows:X-ray right hand performed in the office today demonstrates right thumb CMC arthritis and diffuse arthritis at the DIP joints

## 2020-10-08 ENCOUNTER — TRANSCRIBE ORDERS (OUTPATIENT)
Dept: ADMINISTRATIVE | Facility: HOSPITAL | Age: 60
End: 2020-10-08

## 2020-10-08 DIAGNOSIS — R31.21 ASYMPTOMATIC MICROSCOPIC HEMATURIA: Primary | ICD-10-CM

## 2020-10-22 ENCOUNTER — HOSPITAL ENCOUNTER (OUTPATIENT)
Dept: CT IMAGING | Facility: HOSPITAL | Age: 60
Discharge: HOME | End: 2020-10-22
Payer: COMMERCIAL

## 2020-10-22 DIAGNOSIS — R31.21 ASYMPTOMATIC MICROSCOPIC HEMATURIA: ICD-10-CM

## 2020-10-22 PROCEDURE — G1004 CDSM NDSC: HCPCS

## 2020-10-22 PROCEDURE — 74178 CT ABD&PLV WO CNTR FLWD CNTR: CPT

## 2020-10-22 RX ADMIN — IOHEXOL 100 ML: 350 INJECTION, SOLUTION INTRAVENOUS at 16:19

## 2020-11-05 ENCOUNTER — OFFICE VISIT (OUTPATIENT)
Dept: OBGYN CLINIC | Facility: CLINIC | Age: 60
End: 2020-11-05
Payer: COMMERCIAL

## 2020-11-05 VITALS
WEIGHT: 160 LBS | BODY MASS INDEX: 25.11 KG/M2 | DIASTOLIC BLOOD PRESSURE: 77 MMHG | HEART RATE: 65 BPM | SYSTOLIC BLOOD PRESSURE: 116 MMHG | HEIGHT: 67 IN

## 2020-11-05 DIAGNOSIS — M18.0 ARTHRITIS OF CARPOMETACARPAL (CMC) JOINT OF BOTH THUMBS: Primary | ICD-10-CM

## 2020-11-05 PROCEDURE — 20600 DRAIN/INJ JOINT/BURSA W/O US: CPT | Performed by: ORTHOPAEDIC SURGERY

## 2020-11-05 PROCEDURE — 99213 OFFICE O/P EST LOW 20 MIN: CPT | Performed by: ORTHOPAEDIC SURGERY

## 2020-11-05 RX ORDER — LIDOCAINE HYDROCHLORIDE 5 MG/ML
0.5 INJECTION, SOLUTION INFILTRATION; PERINEURAL
Status: COMPLETED | OUTPATIENT
Start: 2020-11-05 | End: 2020-11-05

## 2020-11-05 RX ORDER — TRIAMCINOLONE ACETONIDE 40 MG/ML
20 INJECTION, SUSPENSION INTRA-ARTICULAR; INTRAMUSCULAR
Status: COMPLETED | OUTPATIENT
Start: 2020-11-05 | End: 2020-11-05

## 2020-11-05 RX ADMIN — TRIAMCINOLONE ACETONIDE 20 MG: 40 INJECTION, SUSPENSION INTRA-ARTICULAR; INTRAMUSCULAR at 10:59

## 2020-11-05 RX ADMIN — LIDOCAINE HYDROCHLORIDE 0.5 ML: 5 INJECTION, SOLUTION INFILTRATION; PERINEURAL at 10:59

## 2020-11-19 ENCOUNTER — TRANSCRIBE ORDERS (OUTPATIENT)
Dept: ADMINISTRATIVE | Facility: HOSPITAL | Age: 60
End: 2020-11-19

## 2020-11-19 DIAGNOSIS — Z78.0 POST-MENOPAUSAL: Primary | ICD-10-CM

## 2020-11-30 ENCOUNTER — HOSPITAL ENCOUNTER (OUTPATIENT)
Dept: RADIOLOGY | Facility: HOSPITAL | Age: 60
Discharge: HOME | End: 2020-11-30
Attending: FAMILY MEDICINE
Payer: COMMERCIAL

## 2020-11-30 DIAGNOSIS — Z78.0 POST-MENOPAUSAL: ICD-10-CM

## 2020-11-30 PROCEDURE — 77080 DXA BONE DENSITY AXIAL: CPT

## 2021-01-06 ENCOUNTER — OFFICE VISIT (OUTPATIENT)
Dept: OBGYN CLINIC | Facility: CLINIC | Age: 61
End: 2021-01-06
Payer: COMMERCIAL

## 2021-01-06 ENCOUNTER — TELEPHONE (OUTPATIENT)
Dept: ADMINISTRATIVE | Facility: OTHER | Age: 61
End: 2021-01-06

## 2021-01-06 VITALS — DIASTOLIC BLOOD PRESSURE: 60 MMHG | SYSTOLIC BLOOD PRESSURE: 100 MMHG | BODY MASS INDEX: 25.06 KG/M2 | HEIGHT: 67 IN

## 2021-01-06 DIAGNOSIS — N30.10 INTERSTITIAL CYSTITIS: ICD-10-CM

## 2021-01-06 DIAGNOSIS — Z12.31 ENCOUNTER FOR SCREENING MAMMOGRAM FOR MALIGNANT NEOPLASM OF BREAST: ICD-10-CM

## 2021-01-06 DIAGNOSIS — Z01.419 WELL WOMAN EXAM: Primary | ICD-10-CM

## 2021-01-06 PROBLEM — N30.11 INTERSTITIAL CYSTITIS (CHRONIC) WITH HEMATURIA: Status: ACTIVE | Noted: 2021-01-06

## 2021-01-06 PROCEDURE — 99386 PREV VISIT NEW AGE 40-64: CPT | Performed by: PHYSICIAN ASSISTANT

## 2021-01-06 RX ORDER — NITROFURANTOIN MACROCRYSTALS 100 MG/1
CAPSULE ORAL
COMMUNITY
Start: 2000-12-30

## 2021-01-06 NOTE — PATIENT INSTRUCTIONS
Dad was tested for Covid-19 - Razia was in the room with Dad- He is now positive and patient  Is not sure what to do.    Please return call    Patient denies cough, fever or respiratory symptoms   Pap deferred  Mammo ordered  Colonoscopy and DEXA UTD  Advised trial of revaree, Key E, coconut oil, etc for vaginal tissue  Pt more likely desires to be as natural as possible with treatment, not interested in estrogen therapy  We reviewed testosterone improving libido which pt feels is not an issue for her  She will plan to watch vaginal lesion and with any changes or growth will plan biopsy/removal  Pt denies any change in years  Will plan to establish care w urology for management of hematuria/IC

## 2021-01-06 NOTE — LETTER
Procedure Request Form: Colonoscopy      Date Requested: 21  Patient: Magdalena Alcantar  Patient : 1960   Referring Provider: Saurabh Leep, DO        Date of Procedure ______________________________       The above patient has informed us that they have completed their   most recent Colonoscopy at your facility  Please complete   this form and attach all corresponding procedure reports/results  Comments __________________________________________________________  ____________________________________________________________________  ____________________________________________________________________  ____________________________________________________________________    Facility Completing Procedure _________________________________________    Form Completed By (print name) _______________________________________      Signature __________________________________________________________      These reports are needed for  compliance    Please fax this completed form and a copy of the procedure report to our office located at [place] [address] as soon as possible to [phone] attention Nathan Cisneros: Phone [phone]    We thank you for your assistance in treating our mutual patient

## 2021-01-06 NOTE — PROGRESS NOTES
Assessment/Plan   Diagnoses and all orders for this visit:    Well woman exam    Encounter for screening mammogram for malignant neoplasm of breast  -     Mammo screening bilateral w 3d & cad; Future    Interstitial cystitis  -     Ambulatory referral to Urology; Future    Other orders  -     nitrofurantoin (MACRODANTIN) 100 mg capsule        Discussion    All questions have been answered to her satisfaction  RTO for APE or sooner if needed      Subjective     HPI   Michelle Little is a 62 y o  female who presents for annual well woman exam    S/p hysterectomy due to h/o endometriosis 1994ish cx did remain but later removed, ovaries removed as well with hysterectomy as well  Pt was on HRT for a few years, approx 3 years  Pt does note that she has a wart on her labia, has had HPV in the past  Has not had changes at all in current lesion over the last few years  It is not bothersome or problematic at all  Pt has not seen urology in quite some time  She does note that she has a h/o chronic hematuria  Her previous OB/GYN used to call in abx whenever she had UTI like sx  I r/w pt IC and UTI sx can be very similar  I would advise pt to reestablish w urology for care and maintenance w her IC  She feels her sx have improved w dietary changes and limiting copper in her diet  (+) SBEs - no breast masses, asymmetry, nipple discharge or bleeding, changes in skin of breast, or breast tenderness bilaterally    No abd/pelvic pain or HAs; No menopausal symptoms: No hot flashes/night sweats, problems with intercourse; sleeping well  Pt is sexually active in a mutually monog/ sexual relationship; She declines std/hiv/hep testing; Feels safe at home  Pt has a h/o dyspareunia  She has been using testosterone that she ordered from Dr cartwright and plans a possible laser surgery as well to see if that improves sx  Feels sx are worse as she ages, but does note a lot of dryness  Does use lubrication w minimal relief  (+) PCP for routine Bw/care;Dr Wilma Stover    Last Pap -    History of abnormal Pap smear: none   Last mammo - 20 BIRADS:2    History of abnormal mammogram: none   Last colonoscopy - 2020  Last Dexa scan - 20     Review of Systems   Constitutional: Negative  Respiratory: Negative  Gastrointestinal: Negative  Endocrine: Negative  Genitourinary: Positive for frequency and vaginal pain         The following portions of the patient's history were reviewed and updated as appropriate: allergies, current medications, past family history, past medical history, past social history, past surgical history and problem list          OB History        3    Para   3    Term   3            AB        Living   1       SAB        TAB        Ectopic        Multiple        Live Births   1                 Past Medical History:   Diagnosis Date    Neck pain     Osteopenia     Seasonal allergic reaction        Past Surgical History:   Procedure Laterality Date    CHOLECYSTECTOMY      laparoscopy    EPIDURAL BLOCK INJECTION N/A 2016    Procedure: BLOCK / INJECTION EPIDURAL STEROID CERVICAL  C7-T1;  Surgeon: Mariaa Morse MD;  Location: [place] [address] MAIN OR;  Service:     EPIDURAL BLOCK INJECTION N/A 2016    Procedure: BLOCK / INJECTION EPIDURAL STEROID CERVICAL  C7-T1;  Surgeon: Mariaa Morse MD;  Location: [place] [address] MAIN OR;  Service:     FRACTURE SURGERY      right ankle    HYSTERECTOMY      OOPHORECTOMY Bilateral        Family History   Problem Relation Age of Onset    No Known Problems Sister     Lung cancer Maternal Grandmother     Kidney cancer Paternal Grandfather     No Known Problems Paternal Aunt     No Known Problems Maternal Aunt     No Known Problems Maternal Aunt     No Known Problems Paternal Aunt        Social History     Socioeconomic History    Marital status: /Civil Union     Spouse name: Not on file    Number of children: Not on file    Years of education: Not on file    Highest education level: Not on file   Occupational History    Not on file   Social Needs    Financial resource strain: Not on file    Food insecurity     Worry: Not on file     Inability: Not on file    Transportation needs     Medical: Not on file     Non-medical: Not on file   Tobacco Use    Smoking status: Former Smoker     Packs/day: 0 25     Years: 40 00     Pack years: 10 00     Types: Cigarettes     Quit date: 2020     Years since quittin 9    Smokeless tobacco: Never Used   Substance and Sexual Activity    Alcohol use: Not Currently     Comment: social    Drug use: No    Sexual activity: Yes     Partners: Male   Lifestyle    Physical activity     Days per week: Not on file     Minutes per session: Not on file    Stress: Not on file   Relationships    Social connections     Talks on phone: Not on file     Gets together: Not on file     Attends Scientologist service: Not on file     Active member of club or organization: Not on file     Attends meetings of clubs or organizations: Not on file     Relationship status: Not on file    Intimate partner violence     Fear of current or ex partner: Not on file     Emotionally abused: Not on file     Physically abused: Not on file     Forced sexual activity: Not on file   Other Topics Concern    Not on file   Social History Narrative    Not on file         Current Outpatient Medications:     diclofenac sodium (VOLTAREN) 1 %, Apply 2 g topically 4 (four) times a day, Disp: 1 Tube, Rfl: 1    linaCLOtide (Linzess) 72 MCG CAPS, Take by mouth as needed, Disp: , Rfl:     naproxen (NAPROSYN) 500 mg tablet, Take 500 mg by mouth as needed , Disp: , Rfl:     nitrofurantoin (MACRODANTIN) 100 mg capsule, , Disp: , Rfl:     rizatriptan (MAXALT) 10 MG tablet, Take 10 mg by mouth, Disp: , Rfl:     Allergies   Allergen Reactions    Other      Seasonal allergies    Percocet [Oxycodone-Acetaminophen] GI Intolerance    Celecoxib Palpitations       Objective   Vitals:    01/06/21 1031   BP: 100/60   BP Location: Right arm   Patient Position: Sitting   Cuff Size: Large   Height: 5' 7" (1 702 m)     Physical Exam  Constitutional:       Appearance: She is well-developed  HENT:      Head: Normocephalic and atraumatic  Cardiovascular:      Rate and Rhythm: Normal rate and regular rhythm  Pulmonary:      Effort: Pulmonary effort is normal       Breath sounds: Normal breath sounds  Chest:      Breasts: Breasts are symmetrical          Right: No inverted nipple, mass, nipple discharge, skin change or tenderness  Left: No inverted nipple, mass, nipple discharge, skin change or tenderness  Abdominal:      General: There is no distension  Palpations: Abdomen is soft  There is no mass  Tenderness: There is no guarding or rebound  Genitourinary:     Exam position: Supine  Labia:         Right: No rash  Left: No rash  Vagina: No signs of injury and foreign body  No vaginal discharge or erythema  Uterus: Absent  Comments: cx and uterus surgically absent  Vaginal tissue w atrophy and some mild erythema noted  Rectal deferred due to recent colonoscopy  Skin:     General: Skin is warm and dry  Neurological:      Mental Status: She is alert and oriented to person, place, and time  Patient Instructions   Pap deferred  Mammo ordered  Colonoscopy and DEXA UTD  Advised trial of revaree, Key E, coconut oil, etc for vaginal tissue  Pt more likely desires to be as natural as possible with treatment, not interested in estrogen therapy  We reviewed testosterone improving libido which pt feels is not an issue for her  She will plan to watch vaginal lesion and with any changes or growth will plan biopsy/removal  Pt denies any change in years  Will plan to establish care w urology for management of hematuria/IC

## 2021-01-06 NOTE — LETTER
Procedure Request Form: Colonoscopy      Date Requested: 21  Patient: Sinda Plaster  Patient : 1960   Referring Provider: Marlon Brochure, DO        Date of Procedure ______________________________       The above patient has informed us that they have completed their   most recent Colonoscopy at your facility  Please complete   this form and attach all corresponding procedure reports/results  Comments __________________________________________________________  ____________________________________________________________________  ____________________________________________________________________  ____________________________________________________________________    Facility Completing Procedure _________________________________________    Form Completed By (print name) _______________________________________      Signature __________________________________________________________      These reports are needed for  compliance    Please fax this completed form and a copy of the procedure report to our office located at [place] [address] as soon as possible to [phone] lena Frye: Phone [phone]    We thank you for your assistance in treating our mutual patient

## 2021-01-06 NOTE — TELEPHONE ENCOUNTER
----- Message from Daily Simons PA-C sent at 1/6/2021 10:56 AM EST -----  [facility] [place] [facility] Gildardo [facility]

## 2021-01-27 NOTE — TELEPHONE ENCOUNTER
Upon review of the In Basket request and the patient's chart, initial outreach has been made via fax, please see Contacts section for details  Accidentally over look [employer]  Will reach out in 3 days if I do not get a faxed result       Thank you  Ashlie Stearns

## 2021-02-01 NOTE — TELEPHONE ENCOUNTER
As a follow-up, a second attempt has been made for outreach via fax, please see Contacts section for details      Thank you  Nancy Fernando

## 2021-02-05 NOTE — TELEPHONE ENCOUNTER
Upon review of the In Basket request we were able to locate, review, and update the patient chart as requested for CRC: Colonoscopy  Any additional questions or concerns should be emailed to the Practice Liaisons via [email or web address] email, please do not reply via In Basket      Thank you  Suresh Smith

## 2021-03-10 DIAGNOSIS — Z23 ENCOUNTER FOR IMMUNIZATION: ICD-10-CM

## 2021-03-18 ENCOUNTER — IMMUNIZATIONS (OUTPATIENT)
Dept: FAMILY MEDICINE CLINIC | Facility: HOSPITAL | Age: 61
End: 2021-03-18

## 2021-03-18 DIAGNOSIS — Z23 ENCOUNTER FOR IMMUNIZATION: Primary | ICD-10-CM

## 2021-03-18 PROCEDURE — 91301 SARS-COV-2 / COVID-19 MRNA VACCINE (MODERNA) 100 MCG: CPT

## 2021-03-18 PROCEDURE — 0011A SARS-COV-2 / COVID-19 MRNA VACCINE (MODERNA) 100 MCG: CPT

## 2021-04-15 ENCOUNTER — IMMUNIZATIONS (OUTPATIENT)
Dept: FAMILY MEDICINE CLINIC | Facility: HOSPITAL | Age: 61
End: 2021-04-15

## 2021-04-15 DIAGNOSIS — Z23 ENCOUNTER FOR IMMUNIZATION: Primary | ICD-10-CM

## 2021-04-15 PROCEDURE — 91301 SARS-COV-2 / COVID-19 MRNA VACCINE (MODERNA) 100 MCG: CPT

## 2021-04-15 PROCEDURE — 0012A SARS-COV-2 / COVID-19 MRNA VACCINE (MODERNA) 100 MCG: CPT

## 2021-04-19 ENCOUNTER — OFFICE VISIT (OUTPATIENT)
Dept: OBGYN CLINIC | Facility: CLINIC | Age: 61
End: 2021-04-19
Payer: COMMERCIAL

## 2021-04-19 VITALS
HEIGHT: 67 IN | WEIGHT: 161 LBS | HEART RATE: 64 BPM | BODY MASS INDEX: 25.27 KG/M2 | DIASTOLIC BLOOD PRESSURE: 75 MMHG | SYSTOLIC BLOOD PRESSURE: 112 MMHG

## 2021-04-19 DIAGNOSIS — M18.0 ARTHRITIS OF CARPOMETACARPAL (CMC) JOINT OF BOTH THUMBS: ICD-10-CM

## 2021-04-19 DIAGNOSIS — M79.641 HAND PAIN, RIGHT: ICD-10-CM

## 2021-04-19 DIAGNOSIS — M65.331 TRIGGER MIDDLE FINGER OF RIGHT HAND: Primary | ICD-10-CM

## 2021-04-19 PROCEDURE — 99213 OFFICE O/P EST LOW 20 MIN: CPT | Performed by: ORTHOPAEDIC SURGERY

## 2021-04-19 PROCEDURE — 3008F BODY MASS INDEX DOCD: CPT | Performed by: ORTHOPAEDIC SURGERY

## 2021-04-19 PROCEDURE — 1036F TOBACCO NON-USER: CPT | Performed by: ORTHOPAEDIC SURGERY

## 2021-04-19 PROCEDURE — 20550 NJX 1 TENDON SHEATH/LIGAMENT: CPT | Performed by: ORTHOPAEDIC SURGERY

## 2021-04-19 RX ORDER — LIDOCAINE HYDROCHLORIDE 10 MG/ML
0.5 INJECTION, SOLUTION EPIDURAL; INFILTRATION; INTRACAUDAL; PERINEURAL
Status: COMPLETED | OUTPATIENT
Start: 2021-04-19 | End: 2021-04-19

## 2021-04-19 RX ORDER — TRIAMCINOLONE ACETONIDE 40 MG/ML
20 INJECTION, SUSPENSION INTRA-ARTICULAR; INTRAMUSCULAR
Status: COMPLETED | OUTPATIENT
Start: 2021-04-19 | End: 2021-04-19

## 2021-04-19 RX ADMIN — TRIAMCINOLONE ACETONIDE 20 MG: 40 INJECTION, SUSPENSION INTRA-ARTICULAR; INTRAMUSCULAR at 15:55

## 2021-04-19 RX ADMIN — LIDOCAINE HYDROCHLORIDE 0.5 ML: 10 INJECTION, SOLUTION EPIDURAL; INFILTRATION; INTRACAUDAL; PERINEURAL at 15:55

## 2021-04-19 NOTE — PROGRESS NOTES
Assessment/Plan:  1  Trigger middle finger of right hand     2  Arthritis of carpometacarpal (CMC) joint of both thumbs     3  Hand pain, right         Scribe Attestation    I,:  Dale Kaur am acting as a scribe while in the presence of the attending physician :       I,:  Sunni Hernandez, DO personally performed the services described in this documentation    as scribed in my presence :         Yohan Styles is a pleasant 62year old who presents to the office today for a follow-up evaluation of her right thumb CMC arthritis and right long finger trigger finger  Upon evaluation today, she has active triggering of her right long finger  A repeat right long finger trigger finger injection was discussed and she was agreeable to this  It was performed in the office today without any complications  I did discuss with the patient that I only do 3 injections and do not perform surgery within 1 month of receiving an injection  The patient would like to the wait until the summer to proceed with a right long finger trigger finger release due to her work schedule  I will see her back in 3 months for a clinical re-evaluation  She understood and had no further questions  Subjective:   Patient ID: Denise Bravo is a 62 y o  female who presents to the office today for a follow-up evaluation of her right thumb CMC arthritis and right long finger trigger finger  She received a steroid injection for her right thumb CMC arthritis in November 2020 and states it provided her with maybe 1 week of relief  She states that her thumb is sore but her pain is only a 2/10  She received a steroid injection for her right long finger in August 2020 and reports relief  She states that her finger is swollen and locking  Review of Systems   Constitutional: Negative for chills and fever  HENT: Negative for drooling and sneezing  Eyes: Negative for redness  Respiratory: Negative for cough and wheezing      Gastrointestinal: Negative for nausea and vomiting  Endocrine: Negative  Genitourinary: Negative  Musculoskeletal: Negative for arthralgias, joint swelling and myalgias  Skin: Negative  Allergic/Immunologic: Negative  Neurological: Negative for weakness and numbness  Hematological: Negative  Psychiatric/Behavioral: Negative for behavioral problems  The patient is not nervous/anxious            Past Medical History:   Diagnosis Date    Neck pain     Osteopenia     Seasonal allergic reaction        Past Surgical History:   Procedure Laterality Date    CHOLECYSTECTOMY      laparoscopy    EPIDURAL BLOCK INJECTION N/A 2016    Procedure: BLOCK / INJECTION EPIDURAL STEROID CERVICAL  C7-T1;  Surgeon: Fabio Lutz MD;  Location: [facility] MAIN OR;  Service:     EPIDURAL BLOCK INJECTION N/A 2016    Procedure: BLOCK / INJECTION EPIDURAL STEROID CERVICAL  C7-T1;  Surgeon: Fabio Lutz MD;  Location: [facility] MAIN OR;  Service:    [address] [place] [address]      right ankle    HYSTERECTOMY      OOPHORECTOMY Bilateral        Family History   Problem Relation Age of Onset    No Known Problems Sister     Lung cancer Maternal Grandmother     Kidney cancer Paternal Grandfather     No Known Problems Paternal Aunt     No Known Problems Maternal Aunt     No Known Problems Maternal Aunt     No Known Problems Paternal Aunt        Social History     Occupational History    Not on file   Tobacco Use    Smoking status: Former Smoker     Packs/day: 0 25     Years: 40 00     Pack years: 10 00     Types: Cigarettes     Quit date: 2020     Years since quittin 2    Smokeless tobacco: Never Used   Substance and Sexual Activity    Alcohol use: Not Currently     Comment: social    Drug use: No    Sexual activity: Yes     Partners: Male         Current Outpatient Medications:     diclofenac sodium (VOLTAREN) 1 %, Apply 2 g topically 4 (four) times a day, Disp: 1 Tube, Rfl: 1    linaCLOtide (Linzess) 72 MCG CAPS, Take by mouth as needed, Disp: , Rfl:     naproxen (NAPROSYN) 500 mg tablet, Take 500 mg by mouth as needed , Disp: , Rfl:     nitrofurantoin (MACRODANTIN) 100 mg capsule, , Disp: , Rfl:     rizatriptan (MAXALT) 10 MG tablet, Take 10 mg by mouth, Disp: , Rfl:     Allergies   Allergen Reactions    Other      Seasonal allergies    Percocet [Oxycodone-Acetaminophen] GI Intolerance    Celecoxib Palpitations       Objective:  Vitals:    04/19/21 1520   BP: 112/75   Pulse: 64       Ortho Exam   Right Thumb  TTP Thumb CMC   Compartments soft  Brisk capillary refill  S/m intact median, radial, and ulnar nerve     Right Long Finger  TTP A1 Pulley  Obvious Triggering   Compartments soft  Brisk capillary refill  S/m intact median, radial, and ulnar nerve     Physical Exam  Vitals signs reviewed  Constitutional:       Appearance: Normal appearance  She is well-developed  HENT:      Head: Normocephalic and atraumatic  Eyes:      General:         Right eye: No discharge  Left eye: No discharge  Extraocular Movements: Extraocular movements intact  Conjunctiva/sclera: Conjunctivae normal    Neck:      Musculoskeletal: Normal range of motion and neck supple  Cardiovascular:      Rate and Rhythm: Normal rate  Pulmonary:      Effort: Pulmonary effort is normal  No respiratory distress  Skin:     General: Skin is warm and dry  Neurological:      General: No focal deficit present  Mental Status: She is alert and oriented to person, place, and time  Psychiatric:         Mood and Affect: Mood normal          Behavior: Behavior normal      Hand/upper extremity injection: R long A1  Universal Protocol:  Consent: Verbal consent obtained    Risks and benefits: risks, benefits and alternatives were discussed  Consent given by: patient  Site marked: the operative site was marked  Supporting Documentation  Indications: pain and tendon swelling   Procedure Details  Condition:trigger finger Location: long finger - R long A1   Needle size: 27 G  Ultrasound guidance: no  Medications administered: 0 5 mL lidocaine (PF) 1 %; 20 mg triamcinolone acetonide 40 mg/mL    Patient tolerance: patient tolerated the procedure well with no immediate complications  Dressing:  Sterile dressing applied         I have personally reviewed pertinent films in PACS and my interpretation is as follows: no new images reviewed today

## 2021-05-25 ENCOUNTER — OFFICE VISIT (OUTPATIENT)
Dept: OBGYN CLINIC | Facility: CLINIC | Age: 61
End: 2021-05-25
Payer: COMMERCIAL

## 2021-05-25 VITALS
HEIGHT: 67 IN | DIASTOLIC BLOOD PRESSURE: 66 MMHG | SYSTOLIC BLOOD PRESSURE: 93 MMHG | HEART RATE: 65 BPM | WEIGHT: 161 LBS | BODY MASS INDEX: 25.27 KG/M2

## 2021-05-25 DIAGNOSIS — M18.0 ARTHRITIS OF CARPOMETACARPAL (CMC) JOINT OF BOTH THUMBS: Primary | ICD-10-CM

## 2021-05-25 PROCEDURE — 1036F TOBACCO NON-USER: CPT | Performed by: ORTHOPAEDIC SURGERY

## 2021-05-25 PROCEDURE — 20600 DRAIN/INJ JOINT/BURSA W/O US: CPT | Performed by: ORTHOPAEDIC SURGERY

## 2021-05-25 PROCEDURE — 3008F BODY MASS INDEX DOCD: CPT | Performed by: ORTHOPAEDIC SURGERY

## 2021-05-25 PROCEDURE — 99213 OFFICE O/P EST LOW 20 MIN: CPT | Performed by: ORTHOPAEDIC SURGERY

## 2021-05-25 RX ORDER — LIDOCAINE HYDROCHLORIDE 10 MG/ML
0.5 INJECTION, SOLUTION INFILTRATION; PERINEURAL
Status: COMPLETED | OUTPATIENT
Start: 2021-05-25 | End: 2021-05-25

## 2021-05-25 RX ORDER — TRIAMCINOLONE ACETONIDE 40 MG/ML
20 INJECTION, SUSPENSION INTRA-ARTICULAR; INTRAMUSCULAR
Status: COMPLETED | OUTPATIENT
Start: 2021-05-25 | End: 2021-05-25

## 2021-05-25 RX ADMIN — TRIAMCINOLONE ACETONIDE 20 MG: 40 INJECTION, SUSPENSION INTRA-ARTICULAR; INTRAMUSCULAR at 10:32

## 2021-05-25 RX ADMIN — LIDOCAINE HYDROCHLORIDE 0.5 ML: 10 INJECTION, SOLUTION INFILTRATION; PERINEURAL at 10:32

## 2021-05-25 NOTE — PROGRESS NOTES
Assessment/Plan:  1  Arthritis of carpometacarpal (CMC) joint of both thumbs  Small joint arthrocentesis: R thumb CMC       Scribe Attestation    I,:  Arcelia Carrera MA am acting as a scribe while in the presence of the attending physician :       I,:  Concepción Sam DO personally performed the services described in this documentation    as scribed in my presence  :             55-year-old female with right thumb CMC arthritis  She is tender to palpation over the thumb CMC  Treatment options were discussed in the form of a repeat steroid injection  She was agreeable to this  She consented and underwent a right thumb CMC injection in the office today without any complications  She may follow up with me as needed  Subjective:   Derick Rivera is a 62 y o  female who presents to the office today for follow up evaluation right thumb CMC arthritis  Patient underwent a steroid injection for this back in November of 2020  Patient notes pain to the base of her thumb  She states this is increased with pinch and   She underwent a right long finger trigger finger injection at her last visit appx 1 month ago which she states did provide her with good relief  She denies any triggering today  Review of Systems   Constitutional: Negative for chills and fever  HENT: Negative for drooling and sneezing  Eyes: Negative for redness  Respiratory: Negative for cough and wheezing  Gastrointestinal: Negative for nausea and vomiting  Musculoskeletal: Negative for arthralgias, joint swelling and myalgias  Neurological: Negative for weakness and numbness  Psychiatric/Behavioral: Negative for behavioral problems  The patient is not nervous/anxious            Past Medical History:   Diagnosis Date    Neck pain     Osteopenia     Seasonal allergic reaction        Past Surgical History:   Procedure Laterality Date    CHOLECYSTECTOMY      laparoscopy    EPIDURAL BLOCK INJECTION N/A 12/29/2016    Procedure: BLOCK / INJECTION EPIDURAL STEROID CERVICAL  C7-T1;  Surgeon: Gayatri Porter MD;  Location: [facility] MAIN OR;  Service:     EPIDURAL BLOCK INJECTION N/A 2016    Procedure: BLOCK / INJECTION EPIDURAL STEROID CERVICAL  C7-T1;  Surgeon: Gayatri Porter MD;  Location: [facility] MAIN OR;  Service:    [address]      right ankle    HYSTERECTOMY      OOPHORECTOMY Bilateral        Family History   Problem Relation Age of Onset    No Known Problems Sister     Lung cancer Maternal Grandmother     Kidney cancer Paternal Grandfather     No Known Problems Paternal Aunt     No Known Problems Maternal Aunt     No Known Problems Maternal Aunt     No Known Problems Paternal Aunt        Social History     Occupational History    Not on file   Tobacco Use    Smoking status: Former Smoker     Packs/day: 0 25     Years: 40 00     Pack years: 10 00     Types: Cigarettes     Quit date: 2020     Years since quittin 3    Smokeless tobacco: Never Used   Substance and Sexual Activity    Alcohol use: Not Currently     Comment: social    Drug use: No    Sexual activity: Yes     Partners: Male         Current Outpatient Medications:     diclofenac sodium (VOLTAREN) 1 %, Apply 2 g topically 4 (four) times a day, Disp: 1 Tube, Rfl: 1    linaCLOtide (Linzess) 72 MCG CAPS, Take by mouth as needed, Disp: , Rfl:     naproxen (NAPROSYN) 500 mg tablet, Take 500 mg by mouth as needed , Disp: , Rfl:     nitrofurantoin (MACRODANTIN) 100 mg capsule, , Disp: , Rfl:     rizatriptan (MAXALT) 10 MG tablet, Take 10 mg by mouth, Disp: , Rfl:     Allergies   Allergen Reactions    Other      Seasonal allergies    Percocet [Oxycodone-Acetaminophen] GI Intolerance    Celecoxib Palpitations       Objective:  Vitals:    21 1003   BP: 93/66   Pulse: 65       Ortho Exam     Right thumb    TTP thumb CMC  Compartments soft  Brisk capillary refill  S/m intact median, radial, and ulnar nerve     Physical Exam  Constitutional: Appearance: She is well-developed  HENT:      Head: Normocephalic and atraumatic  Eyes:      General:         Right eye: No discharge  Left eye: No discharge  Conjunctiva/sclera: Conjunctivae normal    Neck:      Musculoskeletal: Normal range of motion and neck supple  Cardiovascular:      Rate and Rhythm: Normal rate  Pulmonary:      Effort: Pulmonary effort is normal  No respiratory distress  Musculoskeletal:      Comments: As noted in HPI   Skin:     General: Skin is warm and dry  Neurological:      Mental Status: She is alert and oriented to person, place, and time  Psychiatric:         Behavior: Behavior normal          Thought Content: Thought content normal          Judgment: Judgment normal      Small joint arthrocentesis: R thumb CMC  [place] Protocol:  Consent: Verbal consent obtained    Consent given by: patient  Patient identity confirmed: verbally with patient    Supporting Documentation  Indications: pain   Procedure Details  Location: thumb - R thumb CMC  Preparation: Patient was prepped and draped in the usual sterile fashion  Needle size: 27 G  Ultrasound guidance: no  Medications administered: 0 5 mL lidocaine 1 %; 20 mg triamcinolone acetonide 40 mg/mL    Patient tolerance: patient tolerated the procedure well with no immediate complications  Dressing:  Sterile dressing applied

## 2021-05-27 ENCOUNTER — HOSPITAL ENCOUNTER (OUTPATIENT)
Dept: RADIOLOGY | Facility: HOSPITAL | Age: 61
Discharge: HOME | End: 2021-05-27
Payer: COMMERCIAL

## 2021-05-27 VITALS — HEIGHT: 67 IN | WEIGHT: 161 LBS | BODY MASS INDEX: 25.27 KG/M2

## 2021-05-27 DIAGNOSIS — Z12.31 ENCOUNTER FOR SCREENING MAMMOGRAM FOR MALIGNANT NEOPLASM OF BREAST: ICD-10-CM

## 2021-05-27 PROCEDURE — 77063 BREAST TOMOSYNTHESIS BI: CPT

## 2021-05-27 PROCEDURE — 77067 SCR MAMMO BI INCL CAD: CPT

## 2021-11-29 ENCOUNTER — OFFICE VISIT (OUTPATIENT)
Dept: OBGYN CLINIC | Facility: CLINIC | Age: 61
End: 2021-11-29
Payer: COMMERCIAL

## 2021-11-29 VITALS
HEIGHT: 67 IN | HEART RATE: 75 BPM | BODY MASS INDEX: 25.27 KG/M2 | SYSTOLIC BLOOD PRESSURE: 118 MMHG | DIASTOLIC BLOOD PRESSURE: 77 MMHG | WEIGHT: 161 LBS

## 2021-11-29 DIAGNOSIS — M65.331 TRIGGER MIDDLE FINGER OF RIGHT HAND: ICD-10-CM

## 2021-11-29 DIAGNOSIS — M18.0 ARTHRITIS OF CARPOMETACARPAL (CMC) JOINT OF BOTH THUMBS: Primary | ICD-10-CM

## 2021-11-29 PROCEDURE — 99213 OFFICE O/P EST LOW 20 MIN: CPT | Performed by: ORTHOPAEDIC SURGERY

## 2021-11-29 PROCEDURE — 20600 DRAIN/INJ JOINT/BURSA W/O US: CPT | Performed by: ORTHOPAEDIC SURGERY

## 2021-11-29 RX ORDER — TRIAMCINOLONE ACETONIDE 40 MG/ML
20 INJECTION, SUSPENSION INTRA-ARTICULAR; INTRAMUSCULAR
Status: COMPLETED | OUTPATIENT
Start: 2021-11-29 | End: 2021-11-29

## 2021-11-29 RX ORDER — LIDOCAINE HYDROCHLORIDE 10 MG/ML
0.5 INJECTION, SOLUTION INFILTRATION; PERINEURAL
Status: COMPLETED | OUTPATIENT
Start: 2021-11-29 | End: 2021-11-29

## 2021-11-29 RX ADMIN — TRIAMCINOLONE ACETONIDE 20 MG: 40 INJECTION, SUSPENSION INTRA-ARTICULAR; INTRAMUSCULAR at 16:06

## 2021-11-29 RX ADMIN — LIDOCAINE HYDROCHLORIDE 0.5 ML: 10 INJECTION, SOLUTION INFILTRATION; PERINEURAL at 16:06

## 2021-12-20 ENCOUNTER — OFFICE VISIT (OUTPATIENT)
Dept: OBGYN CLINIC | Facility: CLINIC | Age: 61
End: 2021-12-20
Payer: COMMERCIAL

## 2021-12-20 VITALS
WEIGHT: 161 LBS | BODY MASS INDEX: 25.27 KG/M2 | SYSTOLIC BLOOD PRESSURE: 95 MMHG | HEART RATE: 70 BPM | DIASTOLIC BLOOD PRESSURE: 64 MMHG | HEIGHT: 67 IN

## 2021-12-20 DIAGNOSIS — Z98.890 S/P TRIGGER FINGER RELEASE: ICD-10-CM

## 2021-12-20 DIAGNOSIS — M18.0 ARTHRITIS OF CARPOMETACARPAL (CMC) JOINT OF BOTH THUMBS: ICD-10-CM

## 2021-12-20 DIAGNOSIS — M65.331 TRIGGER FINGER, RIGHT MIDDLE FINGER: Primary | ICD-10-CM

## 2021-12-20 PROCEDURE — 99214 OFFICE O/P EST MOD 30 MIN: CPT | Performed by: ORTHOPAEDIC SURGERY

## 2021-12-20 RX ORDER — CEFAZOLIN SODIUM 2 G/50ML
2000 SOLUTION INTRAVENOUS ONCE
Status: CANCELLED | OUTPATIENT
Start: 2021-12-20 | End: 2021-12-20

## 2021-12-23 RX ORDER — ECHINACEA PURPUREA EXTRACT 125 MG
1 TABLET ORAL AS NEEDED
COMMUNITY

## 2021-12-29 ENCOUNTER — HOSPITAL ENCOUNTER (OUTPATIENT)
Facility: HOSPITAL | Age: 61
Setting detail: OUTPATIENT SURGERY
Discharge: HOME | End: 2021-12-29
Attending: ORTHOPAEDIC SURGERY | Admitting: ORTHOPAEDIC SURGERY
Payer: COMMERCIAL

## 2021-12-29 VITALS
DIASTOLIC BLOOD PRESSURE: 71 MMHG | HEART RATE: 61 BPM | RESPIRATION RATE: 18 BRPM | TEMPERATURE: 97.5 F | OXYGEN SATURATION: 100 % | SYSTOLIC BLOOD PRESSURE: 118 MMHG

## 2021-12-29 DIAGNOSIS — M65.331 TRIGGER FINGER, RIGHT MIDDLE FINGER: ICD-10-CM

## 2021-12-29 PROCEDURE — 26160 REMOVE TENDON SHEATH LESION: CPT | Performed by: ORTHOPAEDIC SURGERY

## 2021-12-29 PROCEDURE — 88304 TISSUE EXAM BY PATHOLOGIST: CPT | Performed by: PATHOLOGY

## 2021-12-29 PROCEDURE — 26160 REMOVE TENDON SHEATH LESION: CPT | Performed by: PHYSICIAN ASSISTANT

## 2021-12-29 RX ORDER — CEFAZOLIN SODIUM 2 G/50ML
2000 SOLUTION INTRAVENOUS ONCE
Status: COMPLETED | OUTPATIENT
Start: 2021-12-29 | End: 2021-12-29

## 2021-12-29 RX ORDER — SODIUM CHLORIDE, SODIUM LACTATE, POTASSIUM CHLORIDE, CALCIUM CHLORIDE 600; 310; 30; 20 MG/100ML; MG/100ML; MG/100ML; MG/100ML
100 INJECTION, SOLUTION INTRAVENOUS CONTINUOUS
Status: DISCONTINUED | OUTPATIENT
Start: 2021-12-29 | End: 2021-12-29 | Stop reason: HOSPADM

## 2021-12-29 RX ADMIN — CEFAZOLIN SODIUM 2000 MG: 2 SOLUTION INTRAVENOUS at 11:04

## 2021-12-29 RX ADMIN — SODIUM CHLORIDE, SODIUM LACTATE, POTASSIUM CHLORIDE, AND CALCIUM CHLORIDE 100 ML/HR: .6; .31; .03; .02 INJECTION, SOLUTION INTRAVENOUS at 11:06

## 2022-01-10 ENCOUNTER — OFFICE VISIT (OUTPATIENT)
Dept: OBGYN CLINIC | Facility: CLINIC | Age: 62
End: 2022-01-10

## 2022-01-10 DIAGNOSIS — Z98.890 S/P TRIGGER FINGER RELEASE: Primary | ICD-10-CM

## 2022-01-10 PROCEDURE — 99024 POSTOP FOLLOW-UP VISIT: CPT | Performed by: ORTHOPAEDIC SURGERY

## 2022-01-10 NOTE — PROGRESS NOTES
Assessment/Plan:  1  S/P trigger finger release         I personally saw and examined the patient  She is doing well status post right long finger A1 pulley release and right long finger A1 retinacular cyst cyst excision  She notes no triggering  She notes no signs of infection about the right long finger incision  Upon examination today incision is healing well without signs of infection  She notes no triggering  I did describe a home exercise program to her  I did urge her not to overdo or over use the hand as she has been the past 2 weeks    I will see the patient back 2 weeks for wound check  She will return if there are any concerns or any issues  She can wash her hand with soap and water but should not submerge it  She will avoid any heavy lifting for the next 2 weeks  All questions were answered  Patient is very happy with her care  Subjective:   Elizabeth Balderas is a 62 y o  female who presents 2 week follow-up status post cyst excision and A1 pulley release of right long finger  Patient is doing well  She notes no triggering about the finger  She was using the fingers somewhat over the past 2 weeks including cleaning her car  He denies any fevers or chills  Denies any drainage from the incision  She claims incision is healing well  She has been compliant with wound care  She does admit some swelling in the palm  Review of Systems   Constitutional: Negative for chills, fever and unexpected weight change  HENT: Negative for hearing loss, nosebleeds and sore throat  Eyes: Negative for pain, redness and visual disturbance  Respiratory: Negative for cough, shortness of breath and wheezing  Cardiovascular: Negative for chest pain, palpitations and leg swelling  Gastrointestinal: Negative for abdominal pain, nausea and vomiting  Endocrine: Negative for polydipsia and polyuria  Genitourinary: Negative for dysuria and hematuria     Musculoskeletal:        See HPI   Skin: Negative for rash and wound  Neurological: Negative for dizziness, numbness and headaches  Psychiatric/Behavioral: Negative for decreased concentration and suicidal ideas  The patient is not nervous/anxious            Past Medical History:   Diagnosis Date    Neck pain     Osteopenia     Seasonal allergic reaction        Past Surgical History:   Procedure Laterality Date    CHOLECYSTECTOMY      laparoscopy    EPIDURAL BLOCK INJECTION N/A 2016    Procedure: BLOCK / INJECTION EPIDURAL STEROID CERVICAL  C7-T1;  Surgeon: Elder Wilson MD;  Location: [place] [address] MAIN OR;  Service:     EPIDURAL BLOCK INJECTION N/A 2016    Procedure: BLOCK / INJECTION EPIDURAL STEROID CERVICAL  C7-T1;  Surgeon: Elder Wilson MD;  Location: [place] [address] MAIN OR;  Service:     FRACTURE SURGERY      right ankle    HYSTERECTOMY      OOPHORECTOMY Bilateral     [place] EXCIS PRIMARY GANGLION WRIST Right 2021    Procedure: EXCISION GANGLION CYST RIGHT HAND;  Surgeon: Cl Lucero DO;  Location: [address] [place] [address];  Service: Orthopedics    VT INCISE FINGER TENDON SHEATH Right 2021    Procedure: RIGHT LONG FINGER A 1 PULLEY RELEASE,;  Surgeon: Cl Lucero DO;  Location: [facility];  Service: Orthopedics       Family History   Problem Relation Age of Onset    No Known Problems Sister     Lung cancer Maternal Grandmother     Kidney cancer Paternal Grandfather     No Known Problems Paternal Aunt     No Known Problems Maternal Aunt     No Known Problems Maternal Aunt     No Known Problems Paternal Aunt        Social History     Occupational History    Not on file   Tobacco Use    Smoking status: Former Smoker     Packs/day: 0 25     Years: 40 00     Pack years: 10 00     Types: Cigarettes     Quit date: 2020     Years since quittin 0    Smokeless tobacco: Never Used   Vaping Use    Vaping Use: Never used   Substance and Sexual Activity    Alcohol use: Not Currently     Comment: social    Drug use: No    Sexual activity: Yes     Partners: Male         Current Outpatient Medications:     diclofenac sodium (VOLTAREN) 1 %, Apply 2 g topically 4 (four) times a day, Disp: 1 Tube, Rfl: 1    linaCLOtide (Linzess) 72 MCG CAPS, Take by mouth as needed (Patient not taking: Reported on 12/23/2021 ), Disp: , Rfl:     naproxen (NAPROSYN) 500 mg tablet, Take 500 mg by mouth as needed  (Patient not taking: Reported on 12/23/2021 ), Disp: , Rfl:     nitrofurantoin (MACRODANTIN) 100 mg capsule, , Disp: , Rfl:     rizatriptan (MAXALT) 10 MG tablet, Take 10 mg by mouth, Disp: , Rfl:     sodium chloride (OCEAN) 0 65 % nasal spray, 1 spray into each nostril as needed for congestion, Disp: , Rfl:     Allergies   Allergen Reactions    Other      Seasonal allergies    Percocet [Oxycodone-Acetaminophen] GI Intolerance    Celecoxib Palpitations       Objective: There were no vitals filed for this visit  Ortho Exam    Right long finger  Incision healing well  No induration  No redness  No drainage  Upon palpation no drainage  No triggering  Full range of motion of the finger  FDS FDP extensors intact  Patient can make a full composite fist  Sensation intact over the radial ulnar sides of finger    Physical Exam  Vitals and nursing note reviewed  Constitutional:       Appearance: She is well-developed  HENT:      Head: Normocephalic and atraumatic  Eyes:      General: No scleral icterus  Conjunctiva/sclera: Conjunctivae normal    Cardiovascular:      Rate and Rhythm: Normal rate  Pulmonary:      Effort: Pulmonary effort is normal  No respiratory distress  Musculoskeletal:      Cervical back: Normal range of motion and neck supple  Comments: As noted in HPI   Skin:     General: Skin is warm and dry  Neurological:      Mental Status: She is alert and oriented to person, place, and time     Psychiatric:         Behavior: Behavior normal          I have personally reviewed pertinent films in PACS and my interpretation is as follows:  None today

## 2022-01-27 ENCOUNTER — OFFICE VISIT (OUTPATIENT)
Dept: OBGYN CLINIC | Facility: CLINIC | Age: 62
End: 2022-01-27

## 2022-01-27 VITALS
HEART RATE: 65 BPM | WEIGHT: 161 LBS | DIASTOLIC BLOOD PRESSURE: 75 MMHG | HEIGHT: 67 IN | BODY MASS INDEX: 25.27 KG/M2 | SYSTOLIC BLOOD PRESSURE: 112 MMHG

## 2022-01-27 DIAGNOSIS — Z98.890 S/P TRIGGER FINGER RELEASE: Primary | ICD-10-CM

## 2022-01-27 PROCEDURE — 99024 POSTOP FOLLOW-UP VISIT: CPT | Performed by: ORTHOPAEDIC SURGERY

## 2022-01-27 RX ORDER — PREDNISONE 50 MG/1
50 TABLET ORAL DAILY
Qty: 5 TABLET | Refills: 0 | Status: SHIPPED | OUTPATIENT
Start: 2022-01-27

## 2022-01-27 NOTE — PROGRESS NOTES
Assessment/Plan:  1  S/P trigger finger release  predniSONE 50 mg tablet       I personally saw and examined the patient myself  She is doing well after trigger finger release  She did recently have an injury where she banged the volar aspect of the finger which did cause some pain  For the residual swelling she is having about the finger I did order her some prednisone in order to decrease the swelling and help with her pain  She will continue home exercises  She is able use the hand for all activities and notes note deficiencies were dysfunction  Patient will follow-up in 4-6 weeks for repeat evaluation after she has done exercises and taking the prednisone  I do believe the swelling will resolve over time  There are no signs of infection  Subjective:   José Flores is a 62 y o  female who presents 4 weeks status post trigger finger release of the right long finger  She is doing well  She notes good range of motion without any triggering  She does have some pain about the PIP and D IP joints of the finger  She claims she did Bang the finger as she was working  She denies any open wounds or any fevers or chills  He does complain of some swelling about the finger and does hurt at times  Review of Systems   Constitutional: Negative for chills, fever and unexpected weight change  HENT: Negative for hearing loss, nosebleeds and sore throat  Eyes: Negative for pain, redness and visual disturbance  Respiratory: Negative for cough, shortness of breath and wheezing  Cardiovascular: Negative for chest pain, palpitations and leg swelling  Gastrointestinal: Negative for abdominal pain, nausea and vomiting  Endocrine: Negative for polydipsia and polyuria  Genitourinary: Negative for dysuria and hematuria  Musculoskeletal:        See HPI   Skin: Negative for rash and wound  Neurological: Negative for dizziness, numbness and headaches     Psychiatric/Behavioral: Negative for decreased concentration and suicidal ideas  The patient is not nervous/anxious            Past Medical History:   Diagnosis Date    Neck pain     Osteopenia     Seasonal allergic reaction        Past Surgical History:   Procedure Laterality Date    CHOLECYSTECTOMY      laparoscopy    EPIDURAL BLOCK INJECTION N/A 2016    Procedure: BLOCK / INJECTION EPIDURAL STEROID CERVICAL  C7-T1;  Surgeon: Jame Mullins MD;  Location: [place] [address] MAIN OR;  Service:     EPIDURAL BLOCK INJECTION N/A 2016    Procedure: BLOCK / INJECTION EPIDURAL STEROID CERVICAL  C7-T1;  Surgeon: Jame Mullins MD;  Location: [place] [address] MAIN OR;  Service:     FRACTURE SURGERY      right ankle    HYSTERECTOMY      OOPHORECTOMY Bilateral     [place] EXCIS PRIMARY GANGLION WRIST Right 2021    Procedure: EXCISION GANGLION CYST RIGHT HAND;  Surgeon: Jayda Lucero DO;  Location: [address] [place] [address];  Service: Orthopedics    [place] INCISE FINGER TENDON SHEATH Right 2021    Procedure: RIGHT LONG FINGER A 1 PULLEY RELEASE,;  Surgeon: Jayda Lucero DO;  Location: [place] MAIN OR;  Service: Orthopedics       Family History   Problem Relation Age of Onset    No Known Problems Sister     Lung cancer Maternal Grandmother     Kidney cancer Paternal Grandfather     No Known Problems Paternal Aunt     No Known Problems Maternal Aunt     No Known Problems Maternal Aunt     No Known Problems Paternal Aunt        Social History     Occupational History    Not on file   Tobacco Use    Smoking status: Former Smoker     Packs/day: 0 25     Years: 40 00     Pack years: 10 00     Types: Cigarettes     Quit date: 2020     Years since quittin 0    Smokeless tobacco: Never Used   Vaping Use    Vaping Use: Never used   Substance and Sexual Activity    Alcohol use: Not Currently     Comment: social    Drug use: No    Sexual activity: Yes     Partners: Male         Current Outpatient Medications:     diclofenac sodium (VOLTAREN) 1 %, Apply 2 g topically 4 (four) times a day, Disp: 1 Tube, Rfl: 1    linaCLOtide (Linzess) 72 MCG CAPS, Take by mouth as needed (Patient not taking: Reported on 12/23/2021 ), Disp: , Rfl:     naproxen (NAPROSYN) 500 mg tablet, Take 500 mg by mouth as needed  (Patient not taking: Reported on 12/23/2021 ), Disp: , Rfl:     nitrofurantoin (MACRODANTIN) 100 mg capsule, , Disp: , Rfl:     predniSONE 50 mg tablet, Take 1 tablet (50 mg total) by mouth daily, Disp: 5 tablet, Rfl: 0    rizatriptan (MAXALT) 10 MG tablet, Take 10 mg by mouth, Disp: , Rfl:     sodium chloride (OCEAN) 0 65 % nasal spray, 1 spray into each nostril as needed for congestion, Disp: , Rfl:     Allergies   Allergen Reactions    Other      Seasonal allergies    Percocet [Oxycodone-Acetaminophen] GI Intolerance    Celecoxib Palpitations       Objective:  Vitals:    01/27/22 0945   BP: 112/75   Pulse: 65       Ortho Exam     Right long finger   Incision well healed   Full range of motion  No triggering   Sensation intact over the right long finger  Nontender over the incision  Mild scar formation  There is mild swelling about the digit    Physical Exam  Vitals and nursing note reviewed  Constitutional:       Appearance: She is well-developed  HENT:      Head: Normocephalic and atraumatic  Eyes:      General: No scleral icterus  Conjunctiva/sclera: Conjunctivae normal    Cardiovascular:      Rate and Rhythm: Normal rate  Pulmonary:      Effort: Pulmonary effort is normal  No respiratory distress  Musculoskeletal:      Cervical back: Normal range of motion and neck supple  Comments: As noted in HPI   Skin:     General: Skin is warm and dry  Neurological:      Mental Status: She is alert and oriented to person, place, and time     Psychiatric:         Behavior: Behavior normal          I have personally reviewed pertinent films in PACS and my interpretation is as follows:  None today

## 2022-02-22 ENCOUNTER — OFFICE VISIT (OUTPATIENT)
Dept: OBGYN CLINIC | Facility: CLINIC | Age: 62
End: 2022-02-22

## 2022-02-22 VITALS — BODY MASS INDEX: 25.27 KG/M2 | HEIGHT: 67 IN | WEIGHT: 161 LBS

## 2022-02-22 DIAGNOSIS — Z98.890 S/P TRIGGER FINGER RELEASE: Primary | ICD-10-CM

## 2022-02-22 PROCEDURE — 99024 POSTOP FOLLOW-UP VISIT: CPT | Performed by: ORTHOPAEDIC SURGERY

## 2022-02-22 NOTE — PROGRESS NOTES
Assessment/Plan:  1  S/P trigger finger release         I personally saw and examined the patient myself  She is doing well after trigger finger release ganglion cyst excision  She has full range of motion  She has some mild digital edema  She is doing home exercises which is helping  She has returned to work and activities  She takes Tylenol on occasion  She is very happy with her outcome thus far  Patient will follow-up with me as needed  I see no recurrence of any swelling or masses  Patient can be activity as tolerated  She can use Tylenol as needed  There is no recurrent triggering  All questions were answered  Subjective:   Adarsh Brink is a 62 y o  female who presents approximately 8 weeks status post trigger finger release of the right long finger and ganglion cyst excision of the right long finger A1 pulley  Patient is doing well  She has full range of motion  She has no triggering  She does take Tylenol on occasion for the hand however she also has some pain on this side  She denies any numbness or tingling of the hand  She has been using her hand for all activities as well as work  Review of Systems   Constitutional: Negative for chills, fever and unexpected weight change  HENT: Negative for hearing loss, nosebleeds and sore throat  Eyes: Negative for pain, redness and visual disturbance  Respiratory: Negative for cough, shortness of breath and wheezing  Cardiovascular: Negative for chest pain, palpitations and leg swelling  Gastrointestinal: Negative for abdominal pain, nausea and vomiting  Endocrine: Negative for polydipsia and polyuria  Genitourinary: Negative for dysuria and hematuria  Musculoskeletal:        See HPI   Skin: Negative for rash and wound  Neurological: Negative for dizziness, numbness and headaches  Psychiatric/Behavioral: Negative for decreased concentration and suicidal ideas  The patient is not nervous/anxious            Past Medical History:   Diagnosis Date    Neck pain     Osteopenia     Seasonal allergic reaction        Past Surgical History:   Procedure Laterality Date    CHOLECYSTECTOMY      laparoscopy    EPIDURAL BLOCK INJECTION N/A 2016    Procedure: BLOCK / INJECTION EPIDURAL STEROID CERVICAL  C7-T1;  Surgeon: Emile Bauer MD;  Location: [facility] MAIN OR;  Service:     EPIDURAL BLOCK INJECTION N/A 2016    Procedure: BLOCK / INJECTION EPIDURAL STEROID CERVICAL  C7-T1;  Surgeon: Emile Bauer MD;  Location: [facility] MAIN OR;  Service:     FRACTURE SURGERY      right ankle    HYSTERECTOMY      OOPHORECTOMY Bilateral     [place] EXCIS PRIMARY GANGLION WRIST Right 2021    Procedure: EXCISION GANGLION CYST RIGHT HAND;  Surgeon: Karli Zacarias DO;  Location: [address] [place] [address];  Service: Orthopedics    [place] INCISE FINGER TENDON SHEATH Right 2021    Procedure: RIGHT LONG FINGER A 1 PULLEY RELEASE,;  Surgeon: Karli Zacarias DO;  Location: [facility];  Service: Orthopedics       Family History   Problem Relation Age of Onset    No Known Problems Sister     Lung cancer Maternal Grandmother     Kidney cancer Paternal Grandfather     No Known Problems Paternal Aunt     No Known Problems Maternal Aunt     No Known Problems Maternal Aunt     No Known Problems Paternal Aunt        Social History     Occupational History    Not on file   Tobacco Use    Smoking status: Former Smoker     Packs/day: 0 25     Years: 40 00     Pack years: 10 00     Types: Cigarettes     Quit date: 2020     Years since quittin 1    Smokeless tobacco: Never Used   Vaping Use    Vaping Use: Never used   Substance and Sexual Activity    Alcohol use: Not Currently     Comment: social    Drug use: No    Sexual activity: Yes     Partners: Male         Current Outpatient Medications:     diclofenac sodium (VOLTAREN) 1 %, Apply 2 g topically 4 (four) times a day, Disp: 1 Tube, Rfl: 1    predniSONE 50 mg tablet, Take 1 tablet (50 mg total) by mouth daily, Disp: 5 tablet, Rfl: 0    rizatriptan (MAXALT) 10 MG tablet, Take 10 mg by mouth, Disp: , Rfl:     sodium chloride (OCEAN) 0 65 % nasal spray, 1 spray into each nostril as needed for congestion, Disp: , Rfl:     linaCLOtide (Linzess) 72 MCG CAPS, Take by mouth as needed (Patient not taking: Reported on 12/23/2021 ), Disp: , Rfl:     naproxen (NAPROSYN) 500 mg tablet, Take 500 mg by mouth as needed  (Patient not taking: Reported on 12/23/2021 ), Disp: , Rfl:     nitrofurantoin (MACRODANTIN) 100 mg capsule, , Disp: , Rfl:     Allergies   Allergen Reactions    Other      Seasonal allergies    Percocet [Oxycodone-Acetaminophen] GI Intolerance    Celecoxib Palpitations       Objective: There were no vitals filed for this visit  Ortho Exam    Right hand  Incision well healed   Compartment soft   Brisk cap refill   Mild digital edema about the long finger  Full range of motion long finger   Sensation intact over the radial ulnar sides   No swelling or masses about the incision site    Physical Exam  Vitals and nursing note reviewed  Constitutional:       Appearance: She is well-developed  HENT:      Head: Normocephalic and atraumatic  Eyes:      General: No scleral icterus  Conjunctiva/sclera: Conjunctivae normal    Cardiovascular:      Rate and Rhythm: Normal rate  Pulmonary:      Effort: Pulmonary effort is normal  No respiratory distress  Musculoskeletal:      Cervical back: Normal range of motion and neck supple  Comments: As noted in HPI   Skin:     General: Skin is warm and dry  Neurological:      Mental Status: She is alert and oriented to person, place, and time     Psychiatric:         Behavior: Behavior normal          I have personally reviewed pertinent films in PACS and my interpretation is as follows:  None today

## 2022-05-31 ENCOUNTER — OFFICE VISIT (OUTPATIENT)
Dept: CARDIOLOGY CLINIC | Facility: CLINIC | Age: 62
End: 2022-05-31
Payer: COMMERCIAL

## 2022-05-31 VITALS
OXYGEN SATURATION: 97 % | SYSTOLIC BLOOD PRESSURE: 122 MMHG | WEIGHT: 160 LBS | HEART RATE: 76 BPM | DIASTOLIC BLOOD PRESSURE: 70 MMHG | BODY MASS INDEX: 25.11 KG/M2 | TEMPERATURE: 97.6 F | HEIGHT: 67 IN

## 2022-05-31 DIAGNOSIS — R07.89 CHEST PRESSURE: Primary | ICD-10-CM

## 2022-05-31 PROCEDURE — 93000 ELECTROCARDIOGRAM COMPLETE: CPT | Performed by: INTERNAL MEDICINE

## 2022-05-31 PROCEDURE — 99203 OFFICE O/P NEW LOW 30 MIN: CPT | Performed by: INTERNAL MEDICINE

## 2022-05-31 NOTE — PROGRESS NOTES
Consultation - Cardiology Office  [facility]  Susan Sexton 62 y o  female MRN: [id number]  : 1960  Unit/Bed#:  Encounter: [id number]      ASSESSMENT:  Chest pressure  Easy fatigability      TTE,   EF 55-60%    Hyperlipidemia  Managed by diet    Chronic pain syndrome        RECOMMENDATIONS:  Echocardiogram  Exercise stress test            Thank you for your consultation  If you have any question please call me at [phone]      Primary Care Physician Requesting Consult: Brenda Rosa DO      Reason for Consult / Principal Problem:  Chest pressure        HPI :     Susan Sexton is a 62y o  year old female who was referred by primary care doctor for evaluation of chest pressure and easy fatigability  According to the patient she feels tired more easily than before and occasionally gets pressure in her chest   She also gives a history of hyperlipidemia that has been managed with diet  Patient states that she is fairly active and usually does not get any significant symptoms or chest pressure when she is exercising      Review of Systems  REVIEW OF SYSTEMS:      Constitutional: Negative for activity change, appetite change, chills, fever and unexpected weight change  Positive for fatigue  HENT: Negative for congestion, sore throat and trouble swallowing  Eyes: Negative for discharge and redness  Respiratory: Negative for apnea, cough, shortness of breath and wheezing  Cardiovascular:  Positive for chest pressure, negative for shortness of breath, palpitations and leg swelling  Gastrointestinal: Negative for abdominal distention, abdominal pain, anal bleeding, blood in stool, constipation, diarrhea, nausea and vomiting  Endocrine: Negative for polydipsia, polyphagia and polyuria  Genitourinary: Negative for difficulty urinating, dysuria, flank pain and hematuria  Musculoskeletal: Negative for arthralgias, myalgias and neck stiffness     Skin: Negative for pallor and rash  Allergic/Immunologic: Negative for environmental allergies  Neurological: Negative for dizziness, syncope, light-headedness, numbness and headaches  Hematological: Negative for adenopathy  Does not bruise/bleed easily  Psychiatric/Behavioral: Negative for confusion and hallucinations  The patient is not nervous/anxious        Historical Information   Past Medical History:   Diagnosis Date    Neck pain     Osteopenia     Seasonal allergic reaction      Past Surgical History:   Procedure Laterality Date    CHOLECYSTECTOMY      laparoscopy    EPIDURAL BLOCK INJECTION N/A 2016    Procedure: BLOCK / INJECTION EPIDURAL STEROID CERVICAL  C7-T1;  Surgeon: Jeane Contreras MD;  Location[facility];  Service:     EPIDURAL BLOCK INJECTION N/A 2016    Procedure: BLOCK / INJECTION EPIDURAL STEROID CERVICAL  C7-T1;  Surgeon: Jeane Contreras MD;  Location: [facility] MAIN OR;  Service:     FRACTURE SURGERY      right ankle    HYSTERECTOMY      OOPHORECTOMY Bilateral     [place] EXCIS PRIMARY GANGLION WRIST Right 2021    Procedure: EXCISION GANGLION CYST RIGHT HAND;  Surgeon: Ariela Sandoval DO;  Location: [address] [place] [address];  Service: Orthopedics    [place] INCISE FINGER TENDON SHEATH Right 2021    Procedure: RIGHT LONG FINGER A 1 PULLEY RELEASE,;  Surgeon: Ariela Sandoval DO;  Location: [facility];  Service: Orthopedics     Social History     Substance and Sexual Activity   Alcohol Use Not Currently    Comment: social     Social History     Substance and Sexual Activity   Drug Use No     Social History     Tobacco Use   Smoking Status Former Smoker    Packs/day: 0 25    Years: 40 00    Pack years: 10 00    Types: Cigarettes    Quit date: 2020    Years since quittin 3   Smokeless Tobacco Never Used     Family History:   Family History   Problem Relation Age of Onset    No Known Problems Sister     Lung cancer Maternal Grandmother     Kidney cancer Paternal Grandfather     No Known Problems Paternal Aunt     No Known Problems Maternal Aunt     No Known Problems Maternal Aunt     No Known Problems Paternal Aunt        Meds/Allergies     Allergies   Allergen Reactions    Other      Seasonal allergies    Percocet [Oxycodone-Acetaminophen] GI Intolerance    Celecoxib Palpitations       Current Outpatient Medications:     diclofenac sodium (VOLTAREN) 1 %, Apply 2 g topically 4 (four) times a day, Disp: 1 Tube, Rfl: 1    rizatriptan (MAXALT) 10 MG tablet, Take 10 mg by mouth, Disp: , Rfl:     sodium chloride (OCEAN) 0 65 % nasal spray, 1 spray into each nostril as needed for congestion, Disp: , Rfl:     linaCLOtide (Linzess) 72 MCG CAPS, Take by mouth as needed (Patient not taking: Reported on 12/23/2021 ), Disp: , Rfl:     naproxen (NAPROSYN) 500 mg tablet, Take 500 mg by mouth as needed  (Patient not taking: No sig reported), Disp: , Rfl:     nitrofurantoin (MACRODANTIN) 100 mg capsule, , Disp: , Rfl:     predniSONE 50 mg tablet, Take 1 tablet (50 mg total) by mouth daily (Patient not taking: Reported on 5/31/2022), Disp: 5 tablet, Rfl: 0    Vitals: Blood pressure 122/70, pulse 76, temperature 97 6 °F (36 4 °C), height 5' 7" (1 702 m), weight 72 6 kg (160 lb), SpO2 97 %  ?  Body mass index is 25 06 kg/m²    Vitals:    05/31/22 0849   Weight: 72 6 kg (160 lb)     BP Readings from Last 3 Encounters:   05/31/22 122/70   01/27/22 112/75   12/29/21 118/71       PHYSICAL EXAMINATION:  Neurologic:  Alert & oriented x 3, no new focal deficits, Not in any acute distress,  Constitutional:  Well developed, well nourished, non-toxic appearance   Eyes:  Pupil equal and reacting to light, conjunctiva normal, No JVP, No LNP   HENT:  Atraumatic, oropharynx moist, Neck- normal range of motion, no tenderness,  Neck supple   Respiratory:  Bilateral air entry, mostly clear to auscultation  Cardiovascular: S1-S2 regular with a I/VI systolic murmur   GI:  Soft, nondistended, normal bowel sounds, nontender, no hepatosplenomegaly appreciated  Musculoskeletal: no tenderness, no deformities  Skin:  Well hydrated, no rash   Lymphatic:  No lymphadenopathy noted   Extremities:  No edema and distal pulses are present    Diagnostic Studies Review Cardio:      EKG:  Normal sinus rhythm, heart rate 76 per minute    Cardiac testing:   No results found for this or any previous visit  Imaging:  Chest X-Ray:   No Chest XR results available for this patient  CT-scan of the chest:     No CTA results available for this patient  Lab Review   Lab Results   Component Value Date    WBC 6 18 09/18/2018    HGB 12 8 09/18/2018    HCT 39 4 09/18/2018    MCV 93 09/18/2018    RDW 11 5 (L) 09/18/2018     09/18/2018     BMP:  No results found for: SODIUM, K, CL, CO2, ANIONGAP, BUN, CREATININE, GLUC, GLUF, CALCIUM, CORRECTEDCA, EGFR, MG  LFT:  No results found for: AST, ALT, ALKPHOS, TP, ALB   No results found for: TLQ9VVBPDFOH  No components found for: TSH3  No results found for: HGBA1C  Lipid Profile:   No results found for: CHOLESTEROL, HDL, LDLCALC, TRIG  No results found for: CHOLESTEROL  No results found for: CKTOTAL, CKMB, CKMBINDEX, TROPONINI  No results found for: NTBNP   No results found for this or any previous visit (from the past 672 hour(s))  Dr Valentin Brandon MD, [facility]      "This note has been constructed using a voice recognition system  Therefore there may be syntax, spelling, and/or grammatical errors   Please call if you have any questions  "

## 2022-06-16 ENCOUNTER — TELEPHONE (OUTPATIENT)
Dept: CARDIOLOGY CLINIC | Facility: CLINIC | Age: 62
End: 2022-06-16

## 2022-06-16 NOTE — TELEPHONE ENCOUNTER
Patient called and is asking for a cpt code to assist in get the auth for her echo  Please call her @ [phone] to discuss  She might go through

## 2022-07-09 ENCOUNTER — HOSPITAL ENCOUNTER (OUTPATIENT)
Dept: NON INVASIVE DIAGNOSTICS | Facility: HOSPITAL | Age: 62
Discharge: HOME | End: 2022-07-09
Payer: COMMERCIAL

## 2022-07-09 VITALS
BODY MASS INDEX: 25.11 KG/M2 | WEIGHT: 160 LBS | HEART RATE: 70 BPM | HEIGHT: 67 IN | DIASTOLIC BLOOD PRESSURE: 70 MMHG | SYSTOLIC BLOOD PRESSURE: 122 MMHG

## 2022-07-09 DIAGNOSIS — R07.89 CHEST PRESSURE: ICD-10-CM

## 2022-07-09 PROCEDURE — 93306 TTE W/DOPPLER COMPLETE: CPT

## 2022-07-10 LAB
APICAL FOUR CHAMBER EJECTION FRACTION: 65 %
E WAVE DECELERATION TIME: 196 MS
FRACTIONAL SHORTENING: 37 (ref 28–44)
INTERVENTRICULAR SEPTUM IN DIASTOLE (PARASTERNAL SHORT AXIS VIEW): 0.8 CM
INTERVENTRICULAR SEPTUM: 0.8 CM (ref 0.6–1.1)
LAAS-AP2: 14.4 CM2
LAAS-AP4: 16.8 CM2
LEFT ATRIUM AREA SYSTOLE SINGLE PLANE A4C: 15.2 CM2
LEFT ATRIUM SIZE: 2.9 CM
LEFT INTERNAL DIMENSION IN SYSTOLE: 2.9 CM (ref 2.1–4)
LEFT VENTRICLE DIASTOLIC VOLUME (MOD BIPLANE): 60 ML
LEFT VENTRICLE SYSTOLIC VOLUME (MOD BIPLANE): 25 ML
LEFT VENTRICULAR INTERNAL DIMENSION IN DIASTOLE: 4.6 CM (ref 3.5–6)
LEFT VENTRICULAR POSTERIOR WALL IN END DIASTOLE: 0.9 CM
LEFT VENTRICULAR STROKE VOLUME: 63 ML
LV EF: 58 %
LVSV (TEICH): 63 ML
MV E'TISSUE VEL-LAT: 14 CM/S
MV E'TISSUE VEL-SEP: 11 CM/S
MV PEAK A VEL: 0.71 M/S
MV PEAK E VEL: 76 CM/S
MV STENOSIS PRESSURE HALF TIME: 57 MS
MV VALVE AREA P 1/2 METHOD: 3.86
PA SYSTOLIC PRESSURE: 25 MMHG
RIGHT ATRIUM AREA SYSTOLE A4C: 20.8 CM2
RIGHT VENTRICLE ID DIMENSION: 4.3 CM
SL CV LEFT ATRIUM LENGTH A2C: 4.4 CM
SL CV LV EF: 60
SL CV PED ECHO LEFT VENTRICLE DIASTOLIC VOLUME (MOD BIPLANE) 2D: 96 ML
SL CV PED ECHO LEFT VENTRICLE SYSTOLIC VOLUME (MOD BIPLANE) 2D: 33 ML
TR MAX PG: 22 MMHG
TR PEAK VELOCITY: 2.4 M/S
TRICUSPID VALVE PEAK REGURGITATION VELOCITY: 2.35 M/S

## 2022-07-10 PROCEDURE — 93306 TTE W/DOPPLER COMPLETE: CPT | Performed by: INTERNAL MEDICINE

## 2022-07-11 ENCOUNTER — TELEPHONE (OUTPATIENT)
Dept: CARDIOLOGY CLINIC | Facility: CLINIC | Age: 62
End: 2022-07-11

## 2022-07-11 NOTE — TELEPHONE ENCOUNTER
----- Message from Jenel Galeazzi, MD sent at 7/11/2022  9:56 AM EDT -----  Please inform the patient that the stress test showed NO evidence of any significant blockage in the blood vessels of your heart

## 2022-09-01 ENCOUNTER — HOSPITAL ENCOUNTER (OUTPATIENT)
Dept: RADIOLOGY | Facility: HOSPITAL | Age: 62
Discharge: HOME | End: 2022-09-01
Payer: COMMERCIAL

## 2022-09-01 DIAGNOSIS — M16.12 PRIMARY OSTEOARTHRITIS OF LEFT HIP: ICD-10-CM

## 2022-09-01 DIAGNOSIS — M25.552 PAIN OF LEFT HIP JOINT: ICD-10-CM

## 2022-09-01 PROCEDURE — 73502 X-RAY EXAM HIP UNI 2-3 VIEWS: CPT

## 2022-11-30 ENCOUNTER — TELEPHONE (OUTPATIENT)
Dept: OBGYN CLINIC | Facility: MEDICAL CENTER | Age: 62
End: 2022-11-30

## 2022-11-30 NOTE — TELEPHONE ENCOUNTER
Caller: Patient     Doctor: Horacio De La Rosa    Reason for call:     Patient calling for new hand specialist, she seen Dr Horacio De La Rosa about a year ago and received injections  She would like to schedule for new appointment with new hand specialist   Please give pt call      Call back#: [phone]

## 2022-12-23 ENCOUNTER — OFFICE VISIT (OUTPATIENT)
Dept: OBGYN CLINIC | Facility: CLINIC | Age: 62
End: 2022-12-23

## 2022-12-23 ENCOUNTER — HOSPITAL ENCOUNTER (OUTPATIENT)
Dept: RADIOLOGY | Facility: HOSPITAL | Age: 62
End: 2022-12-23
Attending: STUDENT IN AN ORGANIZED HEALTH CARE EDUCATION/TRAINING PROGRAM

## 2022-12-23 VITALS
HEIGHT: 67 IN | BODY MASS INDEX: 25.11 KG/M2 | HEART RATE: 67 BPM | WEIGHT: 160 LBS | SYSTOLIC BLOOD PRESSURE: 122 MMHG | OXYGEN SATURATION: 99 % | DIASTOLIC BLOOD PRESSURE: 81 MMHG

## 2022-12-23 DIAGNOSIS — M18.0 ARTHRITIS OF CARPOMETACARPAL (CMC) JOINT OF BOTH THUMBS: Primary | ICD-10-CM

## 2022-12-23 DIAGNOSIS — M18.0 ARTHRITIS OF CARPOMETACARPAL (CMC) JOINT OF BOTH THUMBS: ICD-10-CM

## 2022-12-23 RX ORDER — BUPIVACAINE HYDROCHLORIDE 2.5 MG/ML
1 INJECTION, SOLUTION INFILTRATION; PERINEURAL
Status: COMPLETED | OUTPATIENT
Start: 2022-12-23 | End: 2022-12-23

## 2022-12-23 RX ORDER — BETAMETHASONE SODIUM PHOSPHATE AND BETAMETHASONE ACETATE 3; 3 MG/ML; MG/ML
6 INJECTION, SUSPENSION INTRA-ARTICULAR; INTRALESIONAL; INTRAMUSCULAR; SOFT TISSUE
Status: COMPLETED | OUTPATIENT
Start: 2022-12-23 | End: 2022-12-23

## 2022-12-23 RX ADMIN — BETAMETHASONE SODIUM PHOSPHATE AND BETAMETHASONE ACETATE 6 MG: 3; 3 INJECTION, SUSPENSION INTRA-ARTICULAR; INTRALESIONAL; INTRAMUSCULAR; SOFT TISSUE at 09:19

## 2022-12-23 RX ADMIN — BUPIVACAINE HYDROCHLORIDE 1 ML: 2.5 INJECTION, SOLUTION INFILTRATION; PERINEURAL at 09:19

## 2022-12-23 NOTE — PROGRESS NOTES
ORTHOPAEDIC HAND, WRIST, AND ELBOW OFFICE  VISIT       ASSESSMENT/PLAN:      Diagnoses and all orders for this visit:    Arthritis of carpometacarpal (CMC) joint of both thumbs  -     XR thumb right first digit-min 2v; Future  -     Small joint arthrocentesis: R thumb CMC  -     betamethasone acetate-betamethasone sodium phosphate (CELESTONE) injection 6 mg  -     bupivacaine (MARCAINE) 0 25 % injection 3mL    65year old female with right thumb CMC joint arthritis     Physical exam performed today and diagnostic imaging reviewed  Operative vs  Non operative management of CMC arthritis was discussed with the patient, she would like to exhaust all non-operative treatment options before discussing surgery  Patient was offered, accepted, and received a cortisone injection of the right CMC joint  Risks and benefits of CSI were discussed with the patient  The corticosteroid injection was administered without any immediate complication and was well tolerated by the patient  This was done under sterile technique  Post injection instructions and expectations were discussed  It was explained to the patient that cortisone injections can be repeated as early as every 3 months  Wear comfort cool brace as needed for pain  Apply Voltaren gel up to 4 times a day in a "c-shape" around the joint for pain relief  Follow up in three months for re-evaluation, if still experiencing relief may cancel    The patient verbalized understanding of exam findings and treatment plan  We engaged in the shared decision-making process and treatment options were discussed at length with the patient  Surgical and conservative management discussed today along with risks and benefits  Follow Up:  3 months       To Do Next Visit:  Re-evaluation of current issue    General Discussions:  [facility] Arthritis: The anatomy and physiology of carpometacarpal joint arthritis was discussed with the patient today in the office    Deterioration of the articular cartilage eventually leads to hypermobility at the thumb [facility], resulting in joint subluxation, osteophyte formation, cystic changes within the trapezium and base of the first metacarpal, as well as subchondral sclerosis  Eventually, pain, limited mobility, and compensatory hyperextension at the metacarpophalangeal joint may develop  While normal activity and usage of the thumb joint may provide a painful experience to the patient, this typically does not result in damage to the thumb or hand  Treatment options include resting thumb spica splints to decreased joint edema, pain, and inflammation  Therapy exercises to strengthen the thenar musculature may relieve pain, but do not alter the overall continued development of osteoarthritis  Oral medications, topical medications, corticosteroid injections may decrease pain and increase overall function  Eventually, approximately 5% of patients may require surgical intervention  Kris Johnson MD  Attending, Orthopaedic Surgery  Hand, Wrist, and Elbow Surgery  [address] [place] [address]    ____________________________________________________________________________________________________________________________________________      CHIEF COMPLAINT:  Chief Complaint   Patient presents with   • Right Hand - Pain     Thumb       SUBJECTIVE:  Samuel Diez is a 65y o  year old RHD female who presents today for evaluation and treatment of right CMC arthritis that became symptomatic approximately one year ago  She has previously treated this condition with Dr Kandy Tobar where she has received cortisone injections with moderate relief, most recently on  11/29/2021   She notes a constant aching pain in the right thumb CMC joint that is aggravated with increased gripping activity, opposition, gripping items and she finds some relief with a comfort cool brace  She has Voltaren gel that she has used in the past with some relief, but has not used recently  She is a retired , she has had steady work as a substitute  recently       I have personally reviewed all the relevant PMH, PSH, SH, FH, Medications and allergies      PAST MEDICAL HISTORY:  Past Medical History:   Diagnosis Date   • Neck pain    • Osteopenia    • Seasonal allergic reaction        PAST SURGICAL HISTORY:  Past Surgical History:   Procedure Laterality Date   • CHOLECYSTECTOMY      laparoscopy   • EPIDURAL BLOCK INJECTION N/A 12/29/2016    Procedure: BLOCK / INJECTION EPIDURAL STEROID CERVICAL  C7-T1;  Surgeon: Claudean Redman, MD;  Location: [facility] MAIN OR;  Service:    • EPIDURAL BLOCK INJECTION N/A 11/2/2016    Procedure: BLOCK / INJECTION EPIDURAL STEROID CERVICAL  C7-T1;  Surgeon: Claudean Redman, MD;  Location: [facility];  Service:    • FRACTURE SURGERY      right ankle   • HYSTERECTOMY     • OOPHORECTOMY Bilateral    • [place] EXCIS PRIMARY GANGLION WRIST Right 12/29/2021    Procedure: EXCISION GANGLION CYST RIGHT HAND;  Surgeon: Joaquin Nyhan, DO;  Location: [facility]IN [facility];  Service: Orthopedics   • [place] INCISE FINGER TENDON SHEATH Right 12/29/2021    Procedure: RIGHT LONG FINGER A 1 PULLEY RELEASE,;  Surgeon: Joaquin Nyhan, DO;  Location: [facility];  Service: Orthopedics       FAMILY HISTORY:  Family History   Problem Relation Age of Onset   • No Known Problems Sister    • Lung cancer Maternal Grandmother    • Kidney cancer Paternal Grandfather    • No Known Problems Paternal Aunt    • No Known Problems Maternal Aunt    • No Known Problems Maternal Aunt    • No Known Problems Paternal Aunt        SOCIAL HISTORY:  Social History     Tobacco Use   • Smoking status: Former     Packs/day: 0 25     Years: 40 00     Pack years: 10 00     Types: Cigarettes     Quit date: 1/11/2020     Years since quittin 9   • Smokeless tobacco: Never   Vaping Use   • Vaping Use: Never used   Substance Use Topics   • Alcohol use: Not Currently     Comment: social   • Drug use: No       MEDICATIONS:    Current Outpatient Medications:   •  diclofenac sodium (VOLTAREN) 1 %, Apply 2 g topically 4 (four) times a day (Patient not taking: Reported on 2022), Disp: 1 Tube, Rfl: 1  •  linaCLOtide (Linzess) 72 MCG CAPS, Take by mouth as needed (Patient not taking: Reported on 2021), Disp: , Rfl:   •  naproxen (NAPROSYN) 500 mg tablet, Take 500 mg by mouth as needed  (Patient not taking: Reported on 2021), Disp: , Rfl:   •  nitrofurantoin (MACRODANTIN) 100 mg capsule, , Disp: , Rfl:   •  predniSONE 50 mg tablet, Take 1 tablet (50 mg total) by mouth daily (Patient not taking: Reported on 2022), Disp: 5 tablet, Rfl: 0  •  rizatriptan (MAXALT) 10 MG tablet, Take 10 mg by mouth (Patient not taking: Reported on 2022), Disp: , Rfl:   •  sodium chloride (OCEAN) 0 65 % nasal spray, 1 spray into each nostril as needed for congestion (Patient not taking: Reported on 2022), Disp: , Rfl:   No current facility-administered medications for this visit  ALLERGIES:  Allergies   Allergen Reactions   • Other      Seasonal allergies   • Percocet [Oxycodone-Acetaminophen] GI Intolerance   • Celecoxib Palpitations         Review of Systems  Pertinent items are noted in HPI  All other systems were reviewed and are negative      VITALS:  Vitals:    22 0829   BP: 122/81   Pulse: 67   SpO2: 99%       LABS:  HgA1c: No results found for: HGBA1C  BMP: No results found for: GLUCOSE, CALCIUM, NA, K, CO2, CL, BUN, CREATININE    _____________________________________________________  PHYSICAL EXAMINATION:  General: well developed and well nourished, alert, oriented times 3 and appears comfortable  Psychiatric: Normal  HEENT: Normocephalic, Atraumatic Trachea Midline, No torticollis  Pulmonary: No audible wheezing or respiratory distress   Abdomen/GI: Non tender, non distended   Cardiovascular: No pitting edema, 2+ radial pulse   Skin: No masses, erythema, lacerations, fluctation, ulcerations  Neurovascular: Sensation Intact to the Median, Ulnar, Radial Nerve, Motor Intact to the Median, Ulnar, Radial Nerve and Pulses Intact  Musculoskeletal: Normal, except as noted in detailed exam and in HPI  MUSCULOSKELETAL EXAMINATION:    right CMC Exam:  No adduction contracture  No hyperextension deformity of MCP joint  Positive localized tenderness over radial and dorsal aspect of thumb (CMC joint)  Trace effusion CMC joint and thumb  Grind test is Positive for pain and Positive for crepitus  Metacarpal load shift test positive  No triggering or tenderness over the A1 pulley  No pain with Finkelstein’s maneuver               ___________________________________________________  STUDIES REVIEWED:  Images of the right thumb were reviewed in PACS by Dr Catrachito Riggs and demonstrate  moderate CMC joint arthritis        PROCEDURES PERFORMED:  Small joint arthrocentesis: R thumb CMC  [place] Protocol:  Consent: Verbal consent obtained  Risks and benefits: risks, benefits and alternatives were discussed  Consent given by: patient  Time out: Immediately prior to procedure a "time out" was called to verify the correct patient, procedure, equipment, support staff and site/side marked as required    Patient understanding: patient states understanding of the procedure being performed  Site marked: the operative site was marked  Patient identity confirmed: verbally with patient    Supporting Documentation  Indications: pain   Procedure Details  Location: thumb - R thumb CMC  Needle size: 25 G  Ultrasound guidance: no  Approach: volar  Medications administered: 6 mg betamethasone acetate-betamethasone sodium phosphate 6 (3-3) mg/mL; 1 mL bupivacaine 0 25 %    Patient tolerance: patient tolerated the procedure well with no immediate complications  Dressing:  Sterile dressing applied             _____________________________________________________      Scribe Attestation    I,:  Jennyfer Wen am acting as a scribe while in the presence of the attending physician :       I,:  Sushma Skaggs MD personally performed the services described in this documentation    as scribed in my presence :

## 2023-01-12 ENCOUNTER — OFFICE VISIT (OUTPATIENT)
Dept: FAMILY MEDICINE CLINIC | Facility: CLINIC | Age: 63
End: 2023-01-12

## 2023-01-12 VITALS
DIASTOLIC BLOOD PRESSURE: 60 MMHG | TEMPERATURE: 97.8 F | WEIGHT: 160 LBS | BODY MASS INDEX: 25.11 KG/M2 | HEART RATE: 75 BPM | RESPIRATION RATE: 16 BRPM | SYSTOLIC BLOOD PRESSURE: 100 MMHG | HEIGHT: 67 IN | OXYGEN SATURATION: 100 %

## 2023-01-12 DIAGNOSIS — N30.11 INTERSTITIAL CYSTITIS (CHRONIC) WITH HEMATURIA: ICD-10-CM

## 2023-01-12 DIAGNOSIS — G43.109 OCULAR MIGRAINE: ICD-10-CM

## 2023-01-12 DIAGNOSIS — Z13.6 SCREENING FOR CARDIOVASCULAR, RESPIRATORY, AND GENITOURINARY DISEASES: ICD-10-CM

## 2023-01-12 DIAGNOSIS — Z90.721 S/P HYSTERECTOMY WITH OOPHORECTOMY: ICD-10-CM

## 2023-01-12 DIAGNOSIS — R53.1 GENERALIZED WEAKNESS: ICD-10-CM

## 2023-01-12 DIAGNOSIS — Z90.710 S/P HYSTERECTOMY WITH OOPHORECTOMY: ICD-10-CM

## 2023-01-12 DIAGNOSIS — F41.1 GAD (GENERALIZED ANXIETY DISORDER): Primary | ICD-10-CM

## 2023-01-12 DIAGNOSIS — Z13.89 SCREENING FOR CARDIOVASCULAR, RESPIRATORY, AND GENITOURINARY DISEASES: ICD-10-CM

## 2023-01-12 DIAGNOSIS — Z12.31 BREAST CANCER SCREENING BY MAMMOGRAM: ICD-10-CM

## 2023-01-12 DIAGNOSIS — G43.009 MIGRAINE WITHOUT AURA AND WITHOUT STATUS MIGRAINOSUS, NOT INTRACTABLE: ICD-10-CM

## 2023-01-12 DIAGNOSIS — Z13.83 SCREENING FOR CARDIOVASCULAR, RESPIRATORY, AND GENITOURINARY DISEASES: ICD-10-CM

## 2023-01-12 DIAGNOSIS — R53.1 EPISODE OF GENERALIZED WEAKNESS: ICD-10-CM

## 2023-01-12 RX ORDER — ESCITALOPRAM OXALATE 10 MG/1
10 TABLET ORAL DAILY
Qty: 30 TABLET | Refills: 1 | Status: SHIPPED | OUTPATIENT
Start: 2023-01-12

## 2023-01-12 RX ORDER — RIZATRIPTAN BENZOATE 10 MG/1
TABLET ORAL
Qty: 9 TABLET | Refills: 5 | Status: SHIPPED | OUTPATIENT
Start: 2023-01-12

## 2023-01-12 NOTE — PROGRESS NOTES
Assessment/Plan:    No problem-specific Assessment & Plan notes found for this encounter  ALEX new, start meds, f/u 1m    Migraine, benefits from maxalt  Discussed prophylaxis options such as topamax or BB or CCB but reluctant at this time, will consider in future    Ocular migraine, unchanged    Urology referral for IC and frequent UTI    Episodes of weakness and paresthesias  No triggers  Neurologic vs anxiety  Neurology referral recommended     Diagnoses and all orders for this visit:    ALEX (generalized anxiety disorder)  -     escitalopram (LEXAPRO) 10 mg tablet; Take 1 tablet (10 mg total) by mouth daily    Migraine without aura and without status migrainosus, not intractable  -     rizatriptan (MAXALT) 10 mg tablet; 1 po q2hrs prn migraine, 30mg/max in 24 hrs  -     Ambulatory referral to Neurology; Future    Ocular migraine  -     Ambulatory referral to Neurology; Future    Breast cancer screening by mammogram  -     Mammo screening bilateral w 3d & cad; Future    Interstitial cystitis (chronic) with hematuria  -     Ambulatory referral to Urology; Future    Screening for cardiovascular, respiratory, and genitourinary diseases  -     Lipid Panel with Direct LDL reflex; Future    Generalized weakness    S/P hysterectomy with oophorectomy    Episode of generalized weakness  -     Ambulatory referral to Neurology; Future         Return in about 1 month (around 2/12/2023) for Recheck  Subjective:      Patient ID: Buddy Arredondo is a 65 y o  female  Chief Complaint   Patient presents with   • Establish Care     [facility]       HPI  macrobid for uti's, recurrent, urologist Dr Ruth Olvera in past  Gets about 3-4 per year  Been through testing and surgeries in past    Takes maxalt  Prn migraines  4 per month lately  Stress triggered?   Several triggers per pt    Eats healthy mostly  Drinks water    Has osteoporosis  Gyn was Dr Forest Reddy  Had hysterectomy for endometriosis  Last mammo over 1y ago May 2021    Has CDL  B and K     colono 2y ago    Less achey with better diet    Neuro in past for headaches    Episodes of sensation of weakness  Not since 3 weeks  No lightheadedness or syncope  Lasts up to 1-2 minuties  No triggers  No loss of function per pt    lexapro in past for ALEX  zoloft and buspar in past, not sure if was effective  Daily anxiety currently    The following portions of the patient's history were reviewed and updated as appropriate: allergies, current medications, past family history, past medical history, past social history, past surgical history and problem list     Review of Systems   Constitutional: Positive for fatigue  Negative for chills and fever  Neurological: Positive for weakness  Negative for syncope and light-headedness  Current Outpatient Medications   Medication Sig Dispense Refill   • escitalopram (LEXAPRO) 10 mg tablet Take 1 tablet (10 mg total) by mouth daily 30 tablet 1   • rizatriptan (MAXALT) 10 mg tablet 1 po q2hrs prn migraine, 30mg/max in 24 hrs 9 tablet 5     No current facility-administered medications for this visit  Objective:    /60 (BP Location: Left arm, Patient Position: Sitting, Cuff Size: Standard)   Pulse 75   Temp 97 8 °F (36 6 °C) (Temporal)   Resp 16   Ht 5' 6 5" (1 689 m)   Wt 72 6 kg (160 lb)   SpO2 100%   BMI 25 44 kg/m²        Physical Exam  Vitals and nursing note reviewed  Constitutional:       General: She is not in acute distress  Appearance: She is well-developed  She is not ill-appearing  HENT:      Head: Normocephalic  Right Ear: Tympanic membrane and ear canal normal  There is no impacted cerumen  Left Ear: Tympanic membrane and ear canal normal  There is no impacted cerumen  Nose: No congestion  Eyes:      General: No scleral icterus  Conjunctiva/sclera: Conjunctivae normal    Neck:      Vascular: No carotid bruit  Cardiovascular:      Rate and Rhythm: Normal rate and regular rhythm  Heart sounds: No murmur heard  Pulmonary:      Effort: Pulmonary effort is normal  No respiratory distress  Abdominal:      General: There is no distension  Palpations: Abdomen is soft  Tenderness: There is no abdominal tenderness  Musculoskeletal:         General: No deformity  Cervical back: Neck supple  Right lower leg: No edema  Left lower leg: No edema  Skin:     General: Skin is warm and dry  Coloration: Skin is not jaundiced or pale  Neurological:      General: No focal deficit present  Mental Status: She is alert  Gait: Gait normal    Psychiatric:         Mood and Affect: Mood is anxious  Behavior: Behavior normal          Thought Content:  Thought content normal                 Angel Hernandez DO

## 2023-01-13 LAB
CHOLEST SERPL-MCNC: 202 MG/DL (ref 100–199)
HDLC SERPL-MCNC: 60 MG/DL
LDLC SERPL CALC-MCNC: 128 MG/DL (ref 0–99)
MICRODELETION SYND BLD/T FISH: NORMAL
TRIGL SERPL-MCNC: 76 MG/DL (ref 0–149)

## 2023-01-17 ENCOUNTER — TELEPHONE (OUTPATIENT)
Dept: UROLOGY | Facility: AMBULATORY SURGERY CENTER | Age: 63
End: 2023-01-17

## 2023-01-17 NOTE — TELEPHONE ENCOUNTER
New Patient    What is the reason for the patient’s appointment? Patient being referred for urology for recurrent UTI    What office location does the patient prefer? [facility]    Imaging/Lab Results: n/a    Do we accept the patient's insurance or is the patient Self-Pay? Yes, [facility]   Insurance Provider:  Plan Type/Number:  Member ID#: Has the patient had any previous Urologist(s)? [facility]     Have patient records been requested? If not are records showing in Epic:     Has the patient had any outside testing done? n/a    Does the patient have a personal history of cancer?  No    Patient scheduled 2/20/23 at 9 am with [facility] in [place]

## 2023-01-23 ENCOUNTER — OFFICE VISIT (OUTPATIENT)
Dept: PODIATRY | Facility: CLINIC | Age: 63
End: 2023-01-23

## 2023-01-23 ENCOUNTER — HOSPITAL ENCOUNTER (OUTPATIENT)
Dept: RADIOLOGY | Facility: HOSPITAL | Age: 63
Discharge: HOME | End: 2023-01-23
Attending: PODIATRIST

## 2023-01-23 VITALS
BODY MASS INDEX: 25.24 KG/M2 | HEIGHT: 67 IN | SYSTOLIC BLOOD PRESSURE: 110 MMHG | DIASTOLIC BLOOD PRESSURE: 75 MMHG | WEIGHT: 160.8 LBS | HEART RATE: 65 BPM | OXYGEN SATURATION: 99 %

## 2023-01-23 DIAGNOSIS — M20.5X2 HALLUX LIMITUS OF LEFT FOOT: ICD-10-CM

## 2023-01-23 DIAGNOSIS — M79.675 PAIN OF LEFT GREAT TOE: ICD-10-CM

## 2023-01-23 DIAGNOSIS — M79.675 PAIN OF LEFT GREAT TOE: Primary | ICD-10-CM

## 2023-01-23 NOTE — PROGRESS NOTES
Assessment/Plan:    X-rays of the patient's left foot taken today were personally reviewed and interpreted  Findings were consistent with mild to moderate arthritic changes to the left first metatarsophalangeal joint  Osseous spurring is noted to the dorsal aspect of the joint that is limiting the patient's range of motion  The patient's clinical examination is consistent with hallux limitus of the left foot  Treatment options both conservative and surgical were discussed  We will proceed with conservative therapy  The patient does have a pair of custom orthoses for from her chiropractor  We also discussed the benefits of using stiff soled shoes and OTC NSAID therapy on as-needed basis  Topical Voltaren gel may have some value here as well  More persistent episodes of pain, we can consider injection therapy if orals fail  Surgical intervention would include cheilectomy of the left first metatarsal phalangeal joint  As her pain is currently intermittent and relatively mild in nature, she will follow-up on an as-needed basis  Diagnoses and all orders for this visit:    Pain of left great toe  -     X-ray foot left 3+ views; Future    Hallux limitus of left foot          Subjective:      Patient ID: Kaylah Silverman is a 65 y o  female  The patient presents today for her initial consultation with [facility] podiatry group with a chief complaint of left great toe joint pain  The patient states that the pain is not constant but does seem to be exacerbated by certain activities and shoe gear  She has tried OTC NSAID therapy occasionally on an as-needed basis and does use a pair of custom foot orthoses prescribed by her chiropractor  She also frequently uses good-quality over-the-counter arch supports may by [employer]   She cannot recall any history of injury or trauma to her left great toe joint        The following portions of the patient's history were reviewed and updated as appropriate: allergies, current medications, past family history, past medical history, past social history, past surgical history and problem list       PAST MEDICAL HISTORY:  Past Medical History:   Diagnosis Date   • Neck pain    • Osteopenia    • Seasonal allergic reaction        PAST SURGICAL HISTORY:  Past Surgical History:   Procedure Laterality Date   • CHOLECYSTECTOMY      laparoscopy   • EPIDURAL BLOCK INJECTION N/A 12/29/2016    Procedure: BLOCK / INJECTION EPIDURAL STEROID CERVICAL  C7-T1;  Surgeon: Praveen Mcdonnell MD;  Location: [place] [address] MAIN OR;  Service:    • EPIDURAL BLOCK INJECTION N/A 11/2/2016    Procedure: BLOCK / INJECTION EPIDURAL STEROID CERVICAL  C7-T1;  Surgeon: Praveen Mcdonnell MD;  Location: [place] [address] MAIN OR;  Service:    • FRACTURE SURGERY      right ankle   • HYSTERECTOMY     • OOPHORECTOMY Bilateral    • HI EXCISION GANGLION WRIST DORSAL/VOLAR PRIMARY Right 12/29/2021    Procedure: EXCISION GANGLION CYST RIGHT HAND;  Surgeon: Reggie Gooden DO;  Location: [address] [place] [address];  Service: Orthopedics   • HI TENDON SHEATH INCISION Right 12/29/2021    Procedure: RIGHT LONG FINGER A 1 PULLEY RELEASE,;  Surgeon: Reggie Gooden DO;  Location: [place] MAIN OR;  Service: Orthopedics        ALLERGIES:  Other, Percocet [oxycodone-acetaminophen], and Celecoxib    MEDICATIONS:  Current Outpatient Medications   Medication Sig Dispense Refill   • escitalopram (LEXAPRO) 10 mg tablet Take 1 tablet (10 mg total) by mouth daily 30 tablet 1   • rizatriptan (MAXALT) 10 mg tablet 1 po q2hrs prn migraine, 30mg/max in 24 hrs 9 tablet 5     No current facility-administered medications for this visit         SOCIAL HISTORY:  Social History     Socioeconomic History   • Marital status: /Civil Union     Spouse name: None   • Number of children: None   • Years of education: None   • Highest education level: None   Occupational History   • None   Tobacco Use   • Smoking status: Former     Packs/day: 0 25     Years: 40 00     Pack years: 10 00     Types: Cigarettes     Quit date: 1/11/2020     Years since quitting: 3 0   • Smokeless tobacco: Never   Vaping Use   • Vaping Use: Never used   Substance and Sexual Activity   • Alcohol use: Not Currently     Comment: social   • Drug use: No   • Sexual activity: Yes     Partners: Male   Other Topics Concern   • None   Social History Narrative   • None     Social Determinants of Health     Financial Resource Strain: Not on file   Food Insecurity: Not on file   Transportation Needs: Not on file   Physical Activity: Not on file   Stress: Not on file   Social Connections: Not on file   Intimate Partner Violence: Not on file   Housing Stability: Not on file        Review of Systems   Constitutional: Negative  HENT: Negative  Eyes: Negative  Respiratory: Negative  Cardiovascular: Negative  Endocrine: Negative  Musculoskeletal: Negative  Neurological: Negative  Hematological: Negative  Psychiatric/Behavioral: Negative  Objective:      /75 (BP Location: Left arm, Patient Position: Sitting, Cuff Size: Standard)   Pulse 65   Ht 5' 7" (1 702 m)   Wt 72 9 kg (160 lb 12 8 oz)   SpO2 99%   BMI 25 18 kg/m²          Physical Exam  Constitutional:       Appearance: Normal appearance  HENT:      Head: Normocephalic and atraumatic  Nose: Nose normal    Cardiovascular:      Pulses:           Dorsalis pedis pulses are 2+ on the left side  Posterior tibial pulses are 2+ on the left side  Pulmonary:      Effort: Pulmonary effort is normal    Feet:      Comments: There are palpable osteophytes to the dorsal aspect of the patient's left first metatarsophalangeal joint with reduced dorsiflexor range of motion as well as plantarflexed ray range of motion of the first MTPJ; there is no erythema nor edema nor active calor noted to the left great toe joint  Skin:     General: Skin is warm  Capillary Refill: Capillary refill takes less than 2 seconds     Neurological:      General: No focal deficit present  Mental Status: She is alert and oriented to person, place, and time  Psychiatric:         Mood and Affect: Mood normal          Behavior: Behavior normal          Thought Content:  Thought content normal

## 2023-02-03 ENCOUNTER — OFFICE VISIT (OUTPATIENT)
Dept: FAMILY MEDICINE CLINIC | Facility: CLINIC | Age: 63
End: 2023-02-03

## 2023-02-03 VITALS
HEART RATE: 62 BPM | TEMPERATURE: 96.4 F | SYSTOLIC BLOOD PRESSURE: 112 MMHG | OXYGEN SATURATION: 100 % | BODY MASS INDEX: 25.11 KG/M2 | DIASTOLIC BLOOD PRESSURE: 62 MMHG | RESPIRATION RATE: 16 BRPM | WEIGHT: 160 LBS | HEIGHT: 67 IN

## 2023-02-03 DIAGNOSIS — N30.11 INTERSTITIAL CYSTITIS (CHRONIC) WITH HEMATURIA: ICD-10-CM

## 2023-02-03 DIAGNOSIS — R39.9 UTI SYMPTOMS: Primary | ICD-10-CM

## 2023-02-03 LAB
SL AMB  POCT GLUCOSE, UA: NORMAL
SL AMB LEUKOCYTE ESTERASE,UA: NORMAL
SL AMB POCT BILIRUBIN,UA: NORMAL
SL AMB POCT BLOOD,UA: NORMAL
SL AMB POCT CLARITY,UA: CLEAR
SL AMB POCT COLOR,UA: NORMAL
SL AMB POCT KETONES,UA: NORMAL
SL AMB POCT NITRITE,UA: NORMAL
SL AMB POCT PH,UA: 7
SL AMB POCT SPECIFIC GRAVITY,UA: 1.01
SL AMB POCT URINE PROTEIN: NORMAL
SL AMB POCT UROBILINOGEN: NORMAL

## 2023-02-03 RX ORDER — NITROFURANTOIN 25; 75 MG/1; MG/1
100 CAPSULE ORAL 2 TIMES DAILY
Qty: 10 CAPSULE | Refills: 0 | Status: SHIPPED | OUTPATIENT
Start: 2023-02-03 | End: 2023-02-08

## 2023-02-03 NOTE — PROGRESS NOTES
Assessment/Plan:    No problem-specific Assessment & Plan notes found for this encounter  Diagnoses and all orders for this visit:    UTI symptoms  -     POCT urine dip auto non-scope  -     Urine culture  -     nitrofurantoin (MACROBID) 100 mg capsule; Take 1 capsule (100 mg total) by mouth 2 (two) times a day for 5 days    Interstitial cystitis (chronic) with hematuria      Presumptive uti  ua bland but some blood but hx of IC with hematuria  u c/s sent  Urology eval pending  abx but aware may not be necessary pending culture    Return if symptoms worsen or fail to improve  Subjective:      Patient ID: Kaylah Silverman is a 65 y o  female  Chief Complaint   Patient presents with   • Urinary Tract Infection     Symptoms: burning, frequency, pressure  Onset: 1 week  Patient has been taking azo and mannose cranberry  Aliya Counter, CMA        HPI  Pain/pressure in am, suprapubic  Tried azo  Some burning noted  Over 1w  Hx of IC  Plans to see urologist in future  Has food triggers which she avoids    Drinking water  No fever  No blood in urine  Hx of hematuria and IC    The following portions of the patient's history were reviewed and updated as appropriate: allergies, current medications, past family history, past medical history, past social history, past surgical history and problem list     Review of Systems   Constitutional: Negative for chills and fever  Gastrointestinal: Negative for nausea and vomiting  Current Outpatient Medications   Medication Sig Dispense Refill   • escitalopram (LEXAPRO) 10 mg tablet Take 1 tablet (10 mg total) by mouth daily 30 tablet 1   • nitrofurantoin (MACROBID) 100 mg capsule Take 1 capsule (100 mg total) by mouth 2 (two) times a day for 5 days 10 capsule 0   • rizatriptan (MAXALT) 10 mg tablet 1 po q2hrs prn migraine, 30mg/max in 24 hrs 9 tablet 5     No current facility-administered medications for this visit         Objective:    /62   Pulse 62   Temp Tammy Fraser ) 96 4 °F (35 8 °C)   Resp 16   Ht 5' 7" (1 702 m)   Wt 72 6 kg (160 lb)   SpO2 100%   BMI 25 06 kg/m²        Physical Exam  Vitals and nursing note reviewed  Constitutional:       General: She is not in acute distress  Appearance: She is well-developed  She is not ill-appearing  HENT:      Head: Normocephalic  Right Ear: Tympanic membrane normal       Left Ear: Tympanic membrane normal    Eyes:      General: No scleral icterus  Conjunctiva/sclera: Conjunctivae normal    Cardiovascular:      Rate and Rhythm: Normal rate and regular rhythm  Pulmonary:      Effort: Pulmonary effort is normal  No respiratory distress  Breath sounds: No wheezing  Abdominal:      General: There is no distension  Palpations: Abdomen is soft  Tenderness: There is abdominal tenderness  There is no right CVA tenderness, left CVA tenderness or rebound  Musculoskeletal:         General: No deformity  Cervical back: Neck supple  Skin:     General: Skin is warm and dry  Coloration: Skin is not pale  Neurological:      Mental Status: She is alert  Motor: No weakness  Gait: Gait normal    Psychiatric:         Mood and Affect: Mood normal          Behavior: Behavior normal          Thought Content:  Thought content normal                 Robbi Osuna DO

## 2023-02-05 LAB
BACTERIA UR CULT: NORMAL
Lab: NO GROWTH

## 2023-02-13 ENCOUNTER — OFFICE VISIT (OUTPATIENT)
Dept: FAMILY MEDICINE CLINIC | Facility: CLINIC | Age: 63
End: 2023-02-13

## 2023-02-13 VITALS
TEMPERATURE: 97.7 F | OXYGEN SATURATION: 98 % | WEIGHT: 160 LBS | RESPIRATION RATE: 16 BRPM | SYSTOLIC BLOOD PRESSURE: 120 MMHG | HEART RATE: 63 BPM | BODY MASS INDEX: 25.11 KG/M2 | DIASTOLIC BLOOD PRESSURE: 70 MMHG | HEIGHT: 67 IN

## 2023-02-13 DIAGNOSIS — F41.1 GAD (GENERALIZED ANXIETY DISORDER): Primary | ICD-10-CM

## 2023-02-13 DIAGNOSIS — N30.11 INTERSTITIAL CYSTITIS (CHRONIC) WITH HEMATURIA: ICD-10-CM

## 2023-02-13 DIAGNOSIS — G43.009 MIGRAINE WITHOUT AURA AND WITHOUT STATUS MIGRAINOSUS, NOT INTRACTABLE: ICD-10-CM

## 2023-02-13 PROBLEM — R39.9 UTI SYMPTOMS: Status: RESOLVED | Noted: 2023-02-03 | Resolved: 2023-02-13

## 2023-02-13 RX ORDER — ESCITALOPRAM OXALATE 10 MG/1
10 TABLET ORAL DAILY
Qty: 90 TABLET | Refills: 1 | Status: SHIPPED | OUTPATIENT
Start: 2023-02-13

## 2023-02-13 NOTE — PROGRESS NOTES
Assessment/Plan:    No problem-specific Assessment & Plan notes found for this encounter  ALEX stable on lexapro on 5mg/d  Hx of 1 relapse  Suggest at least 12m of therapy    Migraine stable  maxalt prn  Serotonin syndrome risks advised    IC with hematuria, f/u urology      Diagnoses and all orders for this visit:    ALEX (generalized anxiety disorder)  -     escitalopram (LEXAPRO) 10 mg tablet; Take 1 tablet (10 mg total) by mouth daily    Migraine without aura and without status migrainosus, not intractable    Interstitial cystitis (chronic) with hematuria        Return in about 6 months (around 8/13/2023) for Recheck  Subjective:      Patient ID: Maureen Johnson is a 65 y o  female  Chief Complaint   Patient presents with   • Follow-up     1 month f/[id number]-[id number]       HPI  A lot of better  Less gloomy  More positive  Less anxious  Less negative  More calm  started 5mg every other day and tolerated so now every day  Seems to be working    [employer] prn  SSRI warning aware    uro appt Laura NUGENT with hematuria  Recent u c/s neg    Tingling in extremities currently gone  Thinks chiropractic and conservative measures have helped  No syncope or near syncope  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    The following portions of the patient's history were reviewed and updated as appropriate: allergies, current medications, past family history, past medical history, past social history, past surgical history and problem list     Review of Systems   Constitutional: Negative for chills and fever  Current Outpatient Medications   Medication Sig Dispense Refill   • escitalopram (LEXAPRO) 10 mg tablet Take 1 tablet (10 mg total) by mouth daily 90 tablet 1   • rizatriptan (MAXALT) 10 mg tablet 1 po q2hrs prn migraine, 30mg/max in 24 hrs 9 tablet 5     No current facility-administered medications for this visit         Objective:    /70 (BP Location: Right arm, Patient Position: Sitting, Cuff Size: Large) Pulse 63   Temp 97 7 °F (36 5 °C) (Temporal)   Resp 16   Ht 5' 7" (1 702 m)   Wt 72 6 kg (160 lb)   SpO2 98%   BMI 25 06 kg/m²        Physical Exam  Vitals and nursing note reviewed  Constitutional:       General: She is not in acute distress  Appearance: She is well-developed  HENT:      Head: Normocephalic  Right Ear: Tympanic membrane normal       Left Ear: Tympanic membrane normal    Eyes:      General: No scleral icterus  Conjunctiva/sclera: Conjunctivae normal    Cardiovascular:      Rate and Rhythm: Normal rate and regular rhythm  Pulmonary:      Effort: Pulmonary effort is normal  No respiratory distress  Breath sounds: No wheezing  Abdominal:      Palpations: Abdomen is soft  Musculoskeletal:         General: No deformity  Cervical back: Neck supple  Skin:     General: Skin is warm and dry  Coloration: Skin is not pale  Neurological:      Mental Status: She is alert  Motor: No weakness  Gait: Gait normal    Psychiatric:         Mood and Affect: Mood normal          Behavior: Behavior normal          Thought Content:  Thought content normal                 Damien Seen, DO

## 2023-02-20 ENCOUNTER — TELEPHONE (OUTPATIENT)
Dept: OTHER | Facility: OTHER | Age: 63
End: 2023-02-20

## 2023-02-20 ENCOUNTER — OFFICE VISIT (OUTPATIENT)
Dept: UROLOGY | Facility: CLINIC | Age: 63
End: 2023-02-20

## 2023-02-20 ENCOUNTER — PATIENT MESSAGE (OUTPATIENT)
Dept: UROLOGY | Facility: CLINIC | Age: 63
End: 2023-02-20

## 2023-02-20 VITALS
SYSTOLIC BLOOD PRESSURE: 126 MMHG | WEIGHT: 160 LBS | HEART RATE: 85 BPM | HEIGHT: 67 IN | OXYGEN SATURATION: 98 % | BODY MASS INDEX: 25.11 KG/M2 | DIASTOLIC BLOOD PRESSURE: 82 MMHG

## 2023-02-20 DIAGNOSIS — N30.11 INTERSTITIAL CYSTITIS (CHRONIC) WITH HEMATURIA: ICD-10-CM

## 2023-02-20 LAB
BACTERIA UR QL AUTO: ABNORMAL /HPF
BILIRUB UR QL STRIP: NEGATIVE
CLARITY UR: CLEAR
COLOR UR: COLORLESS
GLUCOSE UR STRIP-MCNC: NEGATIVE MG/DL
HGB UR QL STRIP.AUTO: ABNORMAL
KETONES UR STRIP-MCNC: NEGATIVE MG/DL
LEUKOCYTE ESTERASE UR QL STRIP: NEGATIVE
NITRITE UR QL STRIP: NEGATIVE
NON-SQ EPI CELLS URNS QL MICRO: ABNORMAL /HPF
PH UR STRIP.AUTO: 6.5 [PH]
POST-VOID RESIDUAL VOLUME, ML POC: 36 ML
PROT UR STRIP-MCNC: NEGATIVE MG/DL
RBC #/AREA URNS AUTO: ABNORMAL /HPF
SL AMB  POCT GLUCOSE, UA: ABNORMAL
SL AMB LEUKOCYTE ESTERASE,UA: ABNORMAL
SL AMB POCT BILIRUBIN,UA: ABNORMAL
SL AMB POCT BLOOD,UA: ABNORMAL
SL AMB POCT CLARITY,UA: CLEAR
SL AMB POCT COLOR,UA: YELLOW
SL AMB POCT KETONES,UA: ABNORMAL
SL AMB POCT NITRITE,UA: ABNORMAL
SL AMB POCT PH,UA: 7
SL AMB POCT SPECIFIC GRAVITY,UA: 1.01
SL AMB POCT URINE PROTEIN: ABNORMAL
SL AMB POCT UROBILINOGEN: 0.2
SP GR UR STRIP.AUTO: 1.01 (ref 1–1.03)
UROBILINOGEN UR STRIP-ACNC: <2 MG/DL
WBC #/AREA URNS AUTO: ABNORMAL /HPF

## 2023-02-20 NOTE — PROGRESS NOTES
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      4  History of microscopic hematuria  - s/p negative workup (CT + cystoscopy) 2015 and 2019 with [facility]  - resubmit urine     Kalyani Bethea PA-C      Chief Complaint     LUTS    History of Present Illness     Rebecca Pinzon is a 65 y o  female presenting today for consult of recurrent UTIs  Hx of  Microscopic hematuria  Followed with urogyn through [facility]  Cystoscopy 2019 negative for an  abnormalities  Prior hematuria workup 2015  CT renal (10/22/2020) mild right renal fullness  Prior hx of IC and atrophic vaginitis  Hx of hysterectomy + b/l oophrectomy d/t endometriosis  Does believe she had a bladder sling placed  Past history of tobacco use, approximate 15 pack year hx  Quit a number of years ago  Patient reports increasing dysuria and pelvic discomfort  No gross hematuria    Urine dip small leukocytes, nitrite negative, moderate blood  PVR 36mL    Laboratory         Review of Systems     Review of Systems   Constitutional: Negative for activity change, appetite change, chills, diaphoresis, fatigue, fever and unexpected weight change  Respiratory: Negative for chest tightness and shortness of breath  Cardiovascular: Negative for chest pain, palpitations and leg swelling     Gastrointestinal: Negative for abdominal distention, abdominal pain, constipation, diarrhea, nausea and vomiting  Genitourinary: Positive for dysuria and pelvic pain  Negative for decreased urine volume, difficulty urinating, enuresis, flank pain, frequency, genital sores, hematuria and urgency  Musculoskeletal: Negative for back pain, gait problem and myalgias  Skin: Negative for color change, pallor, rash and wound  Psychiatric/Behavioral: Negative for behavioral problems  The patient is not nervous/anxious  Allergies     Allergies   Allergen Reactions   • Other      Seasonal allergies   • Percocet [Oxycodone-Acetaminophen] GI Intolerance   • Celecoxib Palpitations       Physical Exam     Physical Exam  Exam conducted with a chaperone present (Dr Eva Rm, family medicine resident)  Constitutional:       General: She is not in acute distress  Appearance: Normal appearance  She is normal weight  She is not ill-appearing, toxic-appearing or diaphoretic  HENT:      Head: Normocephalic and atraumatic  Eyes:      General:         Right eye: No discharge  Left eye: No discharge  Conjunctiva/sclera: Conjunctivae normal    Pulmonary:      Effort: Pulmonary effort is normal  No respiratory distress  Genitourinary:     Comments: Pale vaginal mucosal  No evidence of prolapse on valsalva  Urethra normal    Musculoskeletal:         General: No swelling or tenderness  Normal range of motion  Skin:     General: Skin is warm and dry  Coloration: Skin is not jaundiced or pale  Neurological:      General: No focal deficit present  Mental Status: She is alert and oriented to person, place, and time  Psychiatric:         Mood and Affect: Mood normal          Thought Content:  Thought content normal          Vital Signs     Vitals:    02/20/23 0905   BP: 126/82   BP Location: Left arm   Patient Position: Sitting   Pulse: 85   SpO2: 98%   Weight: 72 6 kg (160 lb)   Height: 5' 7" (1 702 m)         Current Medications       Current Outpatient Medications:   •  escitalopram (LEXAPRO) 10 mg tablet, Take 1 tablet (10 mg total) by mouth daily, Disp: 90 tablet, Rfl: 1  •  MAGNESIUM CITRATE PO, Take by mouth, Disp: , Rfl:   •  rizatriptan (MAXALT) 10 mg tablet, 1 po q2hrs prn migraine, 30mg/max in 24 hrs, Disp: 9 tablet, Rfl: 5      Active Problems     Patient Active Problem List   Diagnosis   • Chronic pain syndrome   • Cervicalgia   • Cervical disc disorder with radiculopathy of mid-cervical region   • Allergic rhinitis   • Anal fissure   • External hemorrhoids   • Interstitial cystitis (chronic) with hematuria   • Vestibulitis of vagina   • Migraine without aura and without status migrainosus, not intractable   • Ocular migraine   • Episode of generalized weakness   • ALEX (generalized anxiety disorder)   • S/P hysterectomy with oophorectomy   • Screening for cardiovascular, respiratory, and genitourinary diseases   • Breast cancer screening by mammogram   • Generalized weakness         Past Medical History     Past Medical History:   Diagnosis Date   • Neck pain    • Osteopenia    • Seasonal allergic reaction          Surgical History     Past Surgical History:   Procedure Laterality Date   • CHOLECYSTECTOMY      laparoscopy   • EPIDURAL BLOCK INJECTION N/A 12/29/2016    Procedure: BLOCK / INJECTION EPIDURAL STEROID CERVICAL  C7-T1;  Surgeon: Carisa Campos MD;  Location: [facility] MAIN OR;  Service:    • EPIDURAL BLOCK INJECTION N/A 11/2/2016    Procedure: BLOCK / INJECTION EPIDURAL STEROID CERVICAL  C7-T1;  Surgeon: Carisa Campos MD;  Location: [facility] MAIN OR;  Service:    • FRACTURE SURGERY      right ankle   • HYSTERECTOMY     • OOPHORECTOMY Bilateral    • CO EXCISION GANGLION WRIST DORSAL/VOLAR PRIMARY Right 12/29/2021    Procedure: EXCISION GANGLION CYST RIGHT HAND;  Surgeon: Alessandro Johnson DO;  Location: [facility] MAIN OR;  Service: Orthopedics   • CO TENDON SHEATH INCISION Right 12/29/2021    Procedure: RIGHT LONG FINGER A 1 PULLEY RELEASE,;  Surgeon: Tania Wooten DO Jing;  Location: [facility];  Service: Orthopedics         Family History     Family History   Problem Relation Age of Onset   • No Known Problems Sister    • Lung cancer Maternal Grandmother    • Kidney cancer Paternal Grandfather    • No Known Problems Paternal Aunt    • No Known Problems Maternal Aunt    • No Known Problems Maternal Aunt    • No Known Problems Paternal Aunt          Social History     Social History       Radiology

## 2023-02-20 NOTE — TELEPHONE ENCOUNTER
Patient called saying that she had call the [facility] and was told by the pharmacist that the medication was not call in, patient is asking if the office can give the pharmacy a call regarding her medication and to give her a call to let her know when she is able to  her medication

## 2023-02-21 DIAGNOSIS — N30.11 INTERSTITIAL CYSTITIS (CHRONIC) WITH HEMATURIA: Primary | ICD-10-CM

## 2023-02-21 LAB — BACTERIA UR CULT: NORMAL

## 2023-02-21 RX ORDER — ESTRADIOL 0.1 MG/G
1 CREAM VAGINAL 3 TIMES WEEKLY
Qty: 42.5 G | Refills: 3 | Status: SHIPPED | OUTPATIENT
Start: 2023-02-21

## 2023-02-21 NOTE — TELEPHONE ENCOUNTER
From: Elizabeth Balderas  To: [id number]  Sent: 2/20/2023 5:00 PM EST  Subject: Prescription was never sent to my pharmacy    Hi Dr Enma Trejo  I just called my pharmacy and still no prescription  I was just wondering if you called it in to [address] [place] [address] or was it sent to a mail order for [employer]  I’m just anxious to start this to see if this cream will make a difference  Thank you in advance for your time       Shoaib Quispe

## 2023-02-22 ENCOUNTER — HOSPITAL ENCOUNTER (OUTPATIENT)
Dept: RADIOLOGY | Facility: HOSPITAL | Age: 63
Discharge: HOME | End: 2023-02-22
Attending: FAMILY MEDICINE

## 2023-02-22 VITALS — WEIGHT: 160 LBS | HEIGHT: 67 IN | BODY MASS INDEX: 25.11 KG/M2

## 2023-02-22 DIAGNOSIS — Z12.31 BREAST CANCER SCREENING BY MAMMOGRAM: ICD-10-CM

## 2023-03-13 ENCOUNTER — TELEPHONE (OUTPATIENT)
Dept: OBGYN CLINIC | Facility: CLINIC | Age: 63
End: 2023-03-13

## 2023-03-13 PROBLEM — Z13.6 SCREENING FOR CARDIOVASCULAR, RESPIRATORY, AND GENITOURINARY DISEASES: Status: RESOLVED | Noted: 2023-01-12 | Resolved: 2023-03-13

## 2023-03-13 PROBLEM — Z13.89 SCREENING FOR CARDIOVASCULAR, RESPIRATORY, AND GENITOURINARY DISEASES: Status: RESOLVED | Noted: 2023-01-12 | Resolved: 2023-03-13

## 2023-03-13 PROBLEM — Z13.83 SCREENING FOR CARDIOVASCULAR, RESPIRATORY, AND GENITOURINARY DISEASES: Status: RESOLVED | Noted: 2023-01-12 | Resolved: 2023-03-13

## 2023-03-13 NOTE — TELEPHONE ENCOUNTER
L//m for pt to return call to schedule her foot surgery with Dr Allen Hughes   Provided my call back number

## 2023-03-17 ENCOUNTER — PREP FOR PROCEDURE (OUTPATIENT)
Dept: OBGYN CLINIC | Facility: CLINIC | Age: 63
End: 2023-03-17

## 2023-03-18 ENCOUNTER — TELEPHONE (OUTPATIENT)
Dept: NEUROLOGY | Facility: CLINIC | Age: 63
End: 2023-03-18

## 2023-03-18 NOTE — TELEPHONE ENCOUNTER
Spoke to the patient regarding scheduling appointment for her migraine and weakness  The patient states that all of her problems has resolved and she does not wish to schedule appointment

## 2023-04-02 PROBLEM — Z01.818 PREOPERATIVE CLEARANCE: Status: ACTIVE | Noted: 2023-04-02

## 2023-04-03 ENCOUNTER — APPOINTMENT (OUTPATIENT)
Dept: LAB | Facility: HOSPITAL | Age: 63
End: 2023-04-03

## 2023-04-03 DIAGNOSIS — M79.675 PAIN OF LEFT GREAT TOE: ICD-10-CM

## 2023-04-03 DIAGNOSIS — M20.5X2 HALLUX LIMITUS OF LEFT FOOT: ICD-10-CM

## 2023-04-03 LAB
ALBUMIN SERPL BCP-MCNC: 4.1 G/DL (ref 3.5–5)
ALP SERPL-CCNC: 66 U/L (ref 34–104)
ALT SERPL W P-5'-P-CCNC: 12 U/L (ref 7–52)
ANION GAP SERPL CALCULATED.3IONS-SCNC: 5 MMOL/L (ref 4–13)
AST SERPL W P-5'-P-CCNC: 16 U/L (ref 13–39)
BASOPHILS # BLD AUTO: 0.06 THOUSANDS/ÂΜL (ref 0–0.1)
BASOPHILS NFR BLD AUTO: 1 % (ref 0–1)
BILIRUB SERPL-MCNC: 0.58 MG/DL (ref 0.2–1)
BUN SERPL-MCNC: 12 MG/DL (ref 5–25)
CALCIUM SERPL-MCNC: 9.4 MG/DL (ref 8.4–10.2)
CHLORIDE SERPL-SCNC: 103 MMOL/L (ref 96–108)
CO2 SERPL-SCNC: 29 MMOL/L (ref 21–32)
CREAT SERPL-MCNC: 0.62 MG/DL (ref 0.6–1.3)
EOSINOPHIL # BLD AUTO: 0.15 THOUSAND/ÂΜL (ref 0–0.61)
EOSINOPHIL NFR BLD AUTO: 3 % (ref 0–6)
ERYTHROCYTE [DISTWIDTH] IN BLOOD BY AUTOMATED COUNT: 12.1 % (ref 11.6–15.1)
GFR SERPL CREATININE-BSD FRML MDRD: 97 ML/MIN/1.73SQ M
GLUCOSE SERPL-MCNC: 80 MG/DL (ref 65–140)
HCT VFR BLD AUTO: 36.9 % (ref 34.8–46.1)
HGB BLD-MCNC: 12.4 G/DL (ref 11.5–15.4)
IMM GRANULOCYTES # BLD AUTO: 0.02 THOUSAND/UL (ref 0–0.2)
IMM GRANULOCYTES NFR BLD AUTO: 0 % (ref 0–2)
LYMPHOCYTES # BLD AUTO: 2.01 THOUSANDS/ÂΜL (ref 0.6–4.47)
LYMPHOCYTES NFR BLD AUTO: 34 % (ref 14–44)
MCH RBC QN AUTO: 31.4 PG (ref 26.8–34.3)
MCHC RBC AUTO-ENTMCNC: 33.6 G/DL (ref 31.4–37.4)
MCV RBC AUTO: 93 FL (ref 82–98)
MONOCYTES # BLD AUTO: 0.73 THOUSAND/ÂΜL (ref 0.17–1.22)
MONOCYTES NFR BLD AUTO: 12 % (ref 4–12)
NEUTROPHILS # BLD AUTO: 3 THOUSANDS/ÂΜL (ref 1.85–7.62)
NEUTS SEG NFR BLD AUTO: 50 % (ref 43–75)
NRBC BLD AUTO-RTO: 0 /100 WBCS
PLATELET # BLD AUTO: 223 THOUSANDS/UL (ref 149–390)
PMV BLD AUTO: 10.4 FL (ref 8.9–12.7)
POTASSIUM SERPL-SCNC: 3.7 MMOL/L (ref 3.5–5.3)
PROT SERPL-MCNC: 6.7 G/DL (ref 6.4–8.4)
RBC # BLD AUTO: 3.95 MILLION/UL (ref 3.81–5.12)
SODIUM SERPL-SCNC: 137 MMOL/L (ref 135–147)
WBC # BLD AUTO: 5.97 THOUSAND/UL (ref 4.31–10.16)

## 2023-04-06 ENCOUNTER — OFFICE VISIT (OUTPATIENT)
Dept: FAMILY MEDICINE CLINIC | Facility: CLINIC | Age: 63
End: 2023-04-06

## 2023-04-06 VITALS
HEIGHT: 67 IN | RESPIRATION RATE: 18 BRPM | TEMPERATURE: 98.8 F | HEART RATE: 60 BPM | WEIGHT: 164.8 LBS | SYSTOLIC BLOOD PRESSURE: 116 MMHG | DIASTOLIC BLOOD PRESSURE: 68 MMHG | BODY MASS INDEX: 25.87 KG/M2

## 2023-04-06 DIAGNOSIS — Z01.818 PREOPERATIVE CLEARANCE: Primary | ICD-10-CM

## 2023-04-06 DIAGNOSIS — F41.1 GAD (GENERALIZED ANXIETY DISORDER): ICD-10-CM

## 2023-04-06 DIAGNOSIS — G43.009 MIGRAINE WITHOUT AURA AND WITHOUT STATUS MIGRAINOSUS, NOT INTRACTABLE: ICD-10-CM

## 2023-04-06 DIAGNOSIS — M79.675 GREAT TOE PAIN, LEFT: ICD-10-CM

## 2023-04-06 NOTE — PROGRESS NOTES
Assessment/Plan:    No problem-specific Assessment & Plan notes found for this encounter  Medically cleared for surgery     Diagnoses and all orders for this visit:    Preoperative clearance    ALEX (generalized anxiety disorder)    Migraine without aura and without status migrainosus, not intractable    Great toe pain, left      ALEX/migraine stable  Symptoms and risk of serotonin syndrome reviewed and aware        Return if symptoms worsen or fail to improve  Subjective:      Patient ID: Lela Álvarez is a 65 y o  female  Chief Complaint   Patient presents with   • Pre-op Exam     Left great toe surgery 4/14/23 nm lpn       HPI    Planned procedure:  Left great toe pain, foot surgery  Surgeon:  Dr Jaspreet Medina  Date:  4/14/23  Anesthesia type:  Local and general    Prior major surgeries:  Right RTC, hysterectomy, right foot ORIF, cholecystectomy  Problems with anesthesia in past:  n    Can walk 4 blocks or 2 flights of stairs:   y except mechanical pain  History of excessive bleeding:  n  History of excessive clotting:  n  Personal history of DVT or Pe:  n    Taking NSAIDS:  n  Takings vitamins D or E or A or fish oil supplements:      Usual am medications:  Lexapro, may take the am of surgery    Echocardiogram normal 7/9/22, EF 60%  Cbc and cmp wnl      The following portions of the patient's history were reviewed and updated as appropriate: allergies, current medications, past family history, past medical history, past social history, past surgical history and problem list     Review of Systems   Constitutional: Negative for fever  Musculoskeletal: Positive for arthralgias and gait problem           Current Outpatient Medications   Medication Sig Dispense Refill   • escitalopram (LEXAPRO) 10 mg tablet Take 1 tablet (10 mg total) by mouth daily 90 tablet 1   • estradiol (ESTRACE VAGINAL) 0 1 mg/g vaginal cream Insert 1 g into the vagina 3 (three) times a week 42 5 g 3   • famciclovir (FAMVIR) 500 mg tablet Take "500 mg by mouth if needed     • MAGNESIUM CITRATE PO Take by mouth in the morning     • rizatriptan (MAXALT) 10 mg tablet 1 po q2hrs prn migraine, 30mg/max in 24 hrs 9 tablet 5     No current facility-administered medications for this visit  Objective:    /68   Pulse 60   Temp 98 8 °F (37 1 °C)   Resp 18   Ht 5' 7\" (1 702 m)   Wt 74 8 kg (164 lb 12 8 oz)   BMI 25 81 kg/m²        Physical Exam  Vitals and nursing note reviewed  Constitutional:       General: She is not in acute distress  Appearance: She is well-developed  She is not ill-appearing  HENT:      Head: Normocephalic  Right Ear: Tympanic membrane normal       Left Ear: Tympanic membrane normal    Eyes:      Conjunctiva/sclera: Conjunctivae normal    Cardiovascular:      Rate and Rhythm: Normal rate and regular rhythm  Heart sounds: No murmur heard  Pulmonary:      Effort: Pulmonary effort is normal  No respiratory distress  Breath sounds: No wheezing  Abdominal:      Palpations: Abdomen is soft  Musculoskeletal:         General: Swelling and tenderness present  No deformity  Cervical back: Neck supple  Right lower leg: No edema  Left lower leg: No edema  Comments: Left MTP#1 dorsum tenderness with toe dorsiflexion  No warmth or redness   Skin:     General: Skin is warm and dry  Coloration: Skin is not pale  Neurological:      Mental Status: She is alert  Psychiatric:         Mood and Affect: Mood normal          Behavior: Behavior normal          Thought Content:  Thought content normal                 Say Garcia DO    "

## 2023-04-24 ENCOUNTER — OFFICE VISIT (OUTPATIENT)
Dept: PODIATRY | Facility: CLINIC | Age: 63
End: 2023-04-24

## 2023-04-24 VITALS
BODY MASS INDEX: 25.9 KG/M2 | OXYGEN SATURATION: 98 % | DIASTOLIC BLOOD PRESSURE: 75 MMHG | HEART RATE: 66 BPM | WEIGHT: 165 LBS | SYSTOLIC BLOOD PRESSURE: 124 MMHG | HEIGHT: 67 IN

## 2023-04-24 DIAGNOSIS — M20.5X2 HALLUX LIMITUS OF LEFT FOOT: Primary | ICD-10-CM

## 2023-04-24 DIAGNOSIS — M79.675 PAIN OF LEFT GREAT TOE: ICD-10-CM

## 2023-04-24 NOTE — PROGRESS NOTES
Assessment/Plan:     The patient's postoperative visit today was uneventful  The incision line is well coapted and healing well with sutures intact  There are no signs of infection noted  The sutures were removed today without complication  There is no tenderness palpation or with passive range of motion of the left first MTPJ  The patient may turn to comfortable shoe gear as tolerated  She will follow-up with me in 4 weeks  Diagnoses and all orders for this visit:    Hallux limitus of left foot    Pain of left great toe          Subjective:     Patient ID: Kris Michaels is a 65 y o  female  The patient presents today for follow-up status post cheilectomy of the left great toe joint  She has been doing well and denies any pain or discomfort to her left foot today  She has returned to a hiking shoe as she could not tolerate the postop Darco shoe  Review of Systems   Constitutional: Negative  HENT: Negative  Eyes: Negative  Respiratory: Negative  Cardiovascular: Negative  Endocrine: Negative  Musculoskeletal: Negative  Neurological: Negative  Hematological: Negative  Psychiatric/Behavioral: Negative  Objective:     Physical Exam  Constitutional:       Appearance: Normal appearance  HENT:      Head: Normocephalic and atraumatic  Nose: Nose normal    Cardiovascular:      Pulses:           Dorsalis pedis pulses are 2+ on the left side  Posterior tibial pulses are 2+ on the left side  Pulmonary:      Effort: Pulmonary effort is normal    Feet:      Comments: The patient's postoperative visit today was uneventful  The incision line is well coapted and healing well with sutures intact  There are no signs of infection noted  The sutures were removed today without complication  There is no tenderness palpation or with passive range of motion of the left first MTPJ  Skin:     General: Skin is warm        Capillary Refill: Capillary refill takes less than 2 seconds  Neurological:      General: No focal deficit present  Mental Status: She is alert and oriented to person, place, and time  Psychiatric:         Mood and Affect: Mood normal          Behavior: Behavior normal          Thought Content:  Thought content normal

## 2023-05-08 ENCOUNTER — OFFICE VISIT (OUTPATIENT)
Dept: PODIATRY | Facility: CLINIC | Age: 63
End: 2023-05-08

## 2023-05-08 VITALS
WEIGHT: 169 LBS | DIASTOLIC BLOOD PRESSURE: 75 MMHG | HEIGHT: 67 IN | HEART RATE: 65 BPM | SYSTOLIC BLOOD PRESSURE: 115 MMHG | BODY MASS INDEX: 26.53 KG/M2 | OXYGEN SATURATION: 98 %

## 2023-05-08 DIAGNOSIS — M20.5X2 HALLUX LIMITUS OF LEFT FOOT: Primary | ICD-10-CM

## 2023-05-08 NOTE — PROGRESS NOTES
Assessment/Plan:     The patient's clinical examination today is significant for some mild residual edema over the area of the left first metatarsophalangeal joint  There is very mild tenderness with palpation over the first MTPJ mostly over the base of the proximal phalanx  There is minimal tenderness with passive range of motion of the first MTPJ  There is some mild tenderness to the distal Achilles tendon likely secondary to her antalgic gait postoperatively  There is no palpable defect to the Achilles  There is no erythema no edema no calor nor ecchymosis to the posterior left heel  I have recommended that she continue use of her custom molded arch supports along with [employer] sneakers for better support  She will continue gentle stretching exercises of her Achilles tendon  Proper technique was reinforced with the patient  I also added quarter 5/16th inch heel lift to her left shoe beneath the orthotic to reduce tension to the Achilles tendon  Recommend follow-up in 2 months  Diagnoses and all orders for this visit:    Hallux limitus of left foot          Subjective:     Patient ID: Kelsey Robin is a 65 y o  female  The patient presents today for follow-up status post cheilectomy of the left great toe joint  She does note some mild tenderness to the left great toe joint but mostly to the posterior aspect of the left heel  She noted that this occurred recently when she tried wearing a pair of [employer]  Review of Systems   Constitutional: Negative  HENT: Negative  Eyes: Negative  Respiratory: Negative  Cardiovascular: Negative  Endocrine: Negative  Musculoskeletal: Negative  Neurological: Negative  Hematological: Negative  Psychiatric/Behavioral: Negative  Objective:     Physical Exam  Constitutional:       Appearance: Normal appearance  HENT:      Head: Normocephalic and atraumatic        Nose: Nose normal    Cardiovascular:      Pulses: Dorsalis pedis pulses are 2+ on the left side  Posterior tibial pulses are 2+ on the left side  Pulmonary:      Effort: Pulmonary effort is normal    Feet:      Left foot:      Skin integrity: Skin integrity normal       Comments: The patient's clinical examination today is significant for some mild residual edema over the area of the left first metatarsophalangeal joint  There is very mild tenderness with palpation over the first MTPJ mostly over the base of the proximal phalanx  There is minimal tenderness with passive range of motion of the first MTPJ  There is some mild tenderness to the distal Achilles tendon likely secondary to her antalgic gait postoperatively  There is no palpable defect to the Achilles  There is no erythema no edema no calor nor ecchymosis to the posterior left heel  Skin:     General: Skin is warm  Capillary Refill: Capillary refill takes less than 2 seconds  Neurological:      General: No focal deficit present  Mental Status: She is alert and oriented to person, place, and time  Psychiatric:         Mood and Affect: Mood normal          Behavior: Behavior normal          Thought Content:  Thought content normal

## 2023-06-06 ENCOUNTER — TELEPHONE (OUTPATIENT)
Dept: PODIATRY | Facility: CLINIC | Age: 63
End: 2023-06-06

## 2023-06-06 NOTE — TELEPHONE ENCOUNTER
I called pt and left message  Our next appointment is 6/27  Dr Alexandr Rosa is good with that   I'm waiting for a call back

## 2023-06-06 NOTE — TELEPHONE ENCOUNTER
Caller: Rosey Luna    Doctor: Rupesh Urbina    Reason for call: Please call Jayda to reschedule her 6/26 POST OP APPT    Call back#: [phone]

## 2023-06-28 ENCOUNTER — OFFICE VISIT (OUTPATIENT)
Dept: PODIATRY | Facility: CLINIC | Age: 63
End: 2023-06-28

## 2023-06-28 VITALS
HEART RATE: 69 BPM | HEIGHT: 67 IN | WEIGHT: 166.4 LBS | BODY MASS INDEX: 26.12 KG/M2 | DIASTOLIC BLOOD PRESSURE: 70 MMHG | SYSTOLIC BLOOD PRESSURE: 109 MMHG

## 2023-06-28 DIAGNOSIS — M20.5X2 HALLUX LIMITUS OF LEFT FOOT: ICD-10-CM

## 2023-06-28 DIAGNOSIS — M76.62 ACHILLES TENDINITIS OF LEFT LOWER EXTREMITY: Primary | ICD-10-CM

## 2023-06-28 PROCEDURE — 99024 POSTOP FOLLOW-UP VISIT: CPT | Performed by: PODIATRIST

## 2023-06-28 NOTE — PROGRESS NOTES
Assessment/Plan:     The patient's clinical examination today is significant for improving tenderness to the left great toe joint with satisfactory passive range of motion  There is still some mild numbness with palpation of the distal Achilles tendon near its insertion site to the posterior calcaneus  There are no palpable defects of the Achilles  Overall, the patient states the pain is better compared to her prior visit  Overall, the patient is improving  He notes good improvement of her left great toe joint and notes minimal pain at this point in time  Her Achilles is essentially causing more tenderness but right now  She does state that she was on vacation recently and was not able to partake in exercises and stretches  Some ankle exercises were included in her AVS today  We can consider referral to physical therapy if necessary  For now she will try exercises at home with supportive shoe gear  Recommend follow-up in 6 to 8 weeks  Diagnoses and all orders for this visit:    Achilles tendinitis of left lower extremity    Hallux limitus of left foot          Subjective:     Patient ID: Pat Velásquez is a 59 y o  female  The patient presents today for follow-up status post cheilectomy of the left great toe joint  She states the left great toe joint is doing well but still notes some tenderness of her posterior heel secondary to distal Achilles tendinitis  Overall, she does note that the the Achilles tendon does feel better compared to her prior visit  He has not been doing any stretching exercises as she has been away on vacation  Review of Systems   Constitutional: Negative  HENT: Negative  Eyes: Negative  Respiratory: Negative  Cardiovascular: Negative  Endocrine: Negative  Musculoskeletal: Negative  Skin: Negative  Neurological: Negative  Hematological: Negative  Psychiatric/Behavioral: Negative            Objective:     Physical Exam  Constitutional: Appearance: Normal appearance  HENT:      Head: Normocephalic and atraumatic  Nose: Nose normal    Cardiovascular:      Pulses:           Dorsalis pedis pulses are 2+ on the left side  Posterior tibial pulses are 2+ on the left side  Pulmonary:      Effort: Pulmonary effort is normal    Feet:      Comments: The patient's clinical examination today is significant for improving tenderness to the left great toe joint with satisfactory passive range of motion  There is still some mild numbness with palpation of the distal Achilles tendon near its insertion site to the posterior calcaneus  There are no palpable defects of the Achilles  Overall, the patient states the pain is better compared to her prior visit  Skin:     General: Skin is warm  Capillary Refill: Capillary refill takes less than 2 seconds  Neurological:      General: No focal deficit present  Mental Status: She is alert and oriented to person, place, and time  Psychiatric:         Mood and Affect: Mood normal          Behavior: Behavior normal          Thought Content:  Thought content normal

## 2023-08-01 ENCOUNTER — OFFICE VISIT (OUTPATIENT)
Dept: UROLOGY | Facility: CLINIC | Age: 63
End: 2023-08-01
Payer: COMMERCIAL

## 2023-08-01 VITALS
SYSTOLIC BLOOD PRESSURE: 122 MMHG | HEART RATE: 55 BPM | WEIGHT: 164 LBS | OXYGEN SATURATION: 98 % | DIASTOLIC BLOOD PRESSURE: 74 MMHG | HEIGHT: 67 IN | BODY MASS INDEX: 25.74 KG/M2 | RESPIRATION RATE: 18 BRPM

## 2023-08-01 DIAGNOSIS — N30.11 INTERSTITIAL CYSTITIS (CHRONIC) WITH HEMATURIA: Primary | ICD-10-CM

## 2023-08-01 PROCEDURE — 99213 OFFICE O/P EST LOW 20 MIN: CPT | Performed by: PHYSICIAN ASSISTANT

## 2023-08-01 NOTE — PROGRESS NOTES
1. Interstitial cystitis (chronic) with hematuria  Urinalysis with microscopic        Assessment and plan:     1. Vaginal atrophy  2. Dysuria  3. 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     4. History of microscopic hematuria  - s/p negative workup (CT + cystoscopy) 2015 and 2019 with [facility]  - last UA 2/2023 negative for microscopic blood  - f/u 1 year UA prior     Rosa Elena Quevedo PA-C      Chief Complaint     LUTS    History of Present Illness     Farhat Murray is a 59 y.o. female presenting today for follow up of recurrent UTIs. Hx of  Microscopic hematuria. Followed with urogyn through [facility]. Cystoscopy 2019 negative for an  abnormalities. Prior hematuria workup 2015. CT renal (10/22/2020) mild right renal fullness. Prior hx of IC and atrophic vaginitis. Hx of hysterectomy + b/l oophrectomy d/t endometriosis. Does believe she had a bladder sling placed. Past history of tobacco use, approximate 15 pack year hx. Quit a number of years ago. Previously reported increasing dysuria and pelvic discomfort. No gross hematuria. Started on estrace with improvement. When she gets a flair of symptoms, she'll take AZO bladder control with good symptom management. Has occasional need to double void and post void dribbling. UA (2/20/2023) negative for microscopic hematuria. Review of Systems     Review of Systems   Constitutional: Negative for activity change, appetite change, chills, diaphoresis, fatigue, fever and unexpected weight change. Respiratory: Negative for chest tightness and shortness of breath. Cardiovascular: Negative for chest pain, palpitations and leg swelling.    Gastrointestinal: Negative for abdominal distention, abdominal pain, constipation, diarrhea, nausea and vomiting. Genitourinary: Positive for frequency. Negative for decreased urine volume, difficulty urinating, dysuria, enuresis, flank pain, genital sores, hematuria, pelvic pain and urgency. Musculoskeletal: Negative for back pain, gait problem and myalgias. Skin: Negative for color change, pallor, rash and wound. Psychiatric/Behavioral: Negative for behavioral problems. The patient is not nervous/anxious. Allergies     Allergies   Allergen Reactions   • Other      Seasonal allergies   • Percocet [Oxycodone-Acetaminophen] GI Intolerance   • Celecoxib Palpitations       Physical Exam     Physical Exam  Exam conducted with a chaperone present (Dr. Dennis Manzo, family medicine resident). Constitutional:       General: She is not in acute distress. Appearance: Normal appearance. She is normal weight. She is not ill-appearing, toxic-appearing or diaphoretic. HENT:      Head: Normocephalic and atraumatic. Eyes:      General:         Right eye: No discharge. Left eye: No discharge. Conjunctiva/sclera: Conjunctivae normal.   Pulmonary:      Effort: Pulmonary effort is normal. No respiratory distress. Musculoskeletal:         General: No swelling or tenderness. Normal range of motion. Skin:     General: Skin is warm and dry. Coloration: Skin is not jaundiced or pale. Neurological:      General: No focal deficit present. Mental Status: She is alert and oriented to person, place, and time. Psychiatric:         Mood and Affect: Mood normal.         Thought Content:  Thought content normal.         Vital Signs     Vitals:    08/01/23 0722   BP: 122/74   BP Location: Left arm   Patient Position: Sitting   Cuff Size: Adult   Pulse: 55   Resp: 18   SpO2: 98%   Weight: 74.4 kg (164 lb)   Height: 5' 7" (1.702 m)         Current Medications       Current Outpatient Medications:   •  escitalopram (LEXAPRO) 10 mg tablet, Take 1 tablet (10 mg total) by mouth daily, Disp: 90 tablet, Rfl: 1  •  estradiol (ESTRACE VAGINAL) 0.1 mg/g vaginal cream, Insert 1 g into the vagina 3 (three) times a week, Disp: 42.5 g, Rfl: 3  •  famciclovir (FAMVIR) 500 mg tablet, Take 500 mg by mouth if needed, Disp: , Rfl:   •  MAGNESIUM CITRATE PO, Take by mouth in the morning, Disp: , Rfl:   •  rizatriptan (MAXALT) 10 mg tablet, 1 po q2hrs prn migraine, 30mg/max in 24 hrs, Disp: 9 tablet, Rfl: 5      Active Problems     Patient Active Problem List   Diagnosis   • Chronic pain syndrome   • Cervicalgia   • Cervical disc disorder with radiculopathy of mid-cervical region   • Allergic rhinitis   • Anal fissure   • External hemorrhoids   • Interstitial cystitis (chronic) with hematuria   • Vestibulitis of vagina   • Migraine without aura and without status migrainosus, not intractable   • Ocular migraine   • Episode of generalized weakness   • ALEX (generalized anxiety disorder)   • S/P hysterectomy with oophorectomy   • Breast cancer screening by mammogram   • Generalized weakness   • Preoperative clearance   • Great toe pain, left         Past Medical History     Past Medical History:   Diagnosis Date   • Migraines    • Neck pain    • Osteopenia    • Seasonal allergic reaction          Surgical History     Past Surgical History:   Procedure Laterality Date   • ANAL FISSURECTOMY     • CHOLECYSTECTOMY      laparoscopy   • EPIDURAL BLOCK INJECTION N/A 12/29/2016    Procedure: BLOCK / INJECTION EPIDURAL STEROID CERVICAL  C7-T1;  Surgeon: Curry Madden MD;  Location: [address] [place] [address] MAIN OR;  Service:    • EPIDURAL BLOCK INJECTION N/A 11/02/2016    Procedure: BLOCK / INJECTION EPIDURAL STEROID CERVICAL  C7-T1;  Surgeon: Curry Madden MD;  Location: [address] [place] [address] MAIN OR;  Service:    • FRACTURE SURGERY      right ankle   • HYSTERECTOMY     • OOPHORECTOMY Bilateral    • MS EXCISION GANGLION WRIST DORSAL/VOLAR PRIMARY Right 12/29/2021    Procedure: EXCISION GANGLION CYST RIGHT HAND;  Surgeon: Salvador Bernabe DO; Location: [facility] MAIN OR;  Service: Orthopedics   • AL HALLUX RIGIDUS W/CHEILECTOMY 1ST MP JT W/O IMPLT Left 4/14/2023    Procedure: CHEILECTOMY LEFT GREAT TOE JOINT;  Surgeon: Doug Bean DPM;  Location:  MAIN OR;  Service: Podiatry   • AL TENDON SHEATH INCISION Right 12/29/2021    Procedure: RIGHT LONG FINGER A 1 PULLEY RELEASE,;  Surgeon: Cruz French DO;  Location: [facility] MAIN OR;  Service: Orthopedics   • ROTATOR CUFF REPAIR Right          Family History     Family History   Problem Relation Age of Onset   • No Known Problems Mother    • No Known Problems Father    • No Known Problems Sister    • Lung cancer Maternal Grandmother    • No Known Problems Maternal Grandfather    • No Known Problems Paternal Grandmother    • Kidney cancer Paternal Grandfather    • No Known Problems Maternal Aunt    • No Known Problems Maternal Aunt    • No Known Problems Paternal Aunt    • No Known Problems Paternal Aunt          Social History     Social History       Radiology

## 2023-09-05 ENCOUNTER — OFFICE VISIT (OUTPATIENT)
Dept: PODIATRY | Facility: CLINIC | Age: 63
End: 2023-09-05
Payer: COMMERCIAL

## 2023-09-05 VITALS
DIASTOLIC BLOOD PRESSURE: 75 MMHG | WEIGHT: 165 LBS | BODY MASS INDEX: 25.84 KG/M2 | SYSTOLIC BLOOD PRESSURE: 117 MMHG | OXYGEN SATURATION: 99 % | HEART RATE: 64 BPM

## 2023-09-05 DIAGNOSIS — M20.5X2 HALLUX LIMITUS OF LEFT FOOT: Primary | ICD-10-CM

## 2023-09-05 PROCEDURE — 99212 OFFICE O/P EST SF 10 MIN: CPT | Performed by: PODIATRIST

## 2023-09-05 NOTE — PROGRESS NOTES
Assessment/Plan:     On examination today, the incision line to the left first MTPJ is well-healed; there is no erythema nor edema to the left foot. Passive range of motion of the left great toe joint is satisfactory. Left ankle range of motion is satisfactory. The patient is doing fairly well from a clinical standpoint. She still notes some occasions of soreness and tenderness usually getting up after periods of rest.  There are no other acute pedal issues. The patient may follow-up on as-needed basis. Diagnoses and all orders for this visit:    Hallux limitus of left foot          Subjective:     Patient ID: Salbador Serrano is a 59 y.o. female. The patient presents today for follow-up status post left first MTPJ cheilectomy. The patient has been doing well and has had a busy summer. She still notes some occasional soreness in the area of the left great toe joint, mostly when getting up after periods of rest.  She also notes some similar tenderness in other joints in her feet as well.       PAST MEDICAL HISTORY:  Past Medical History:   Diagnosis Date   • Migraines    • Neck pain    • Osteopenia    • Seasonal allergic reaction        PAST SURGICAL HISTORY:  Past Surgical History:   Procedure Laterality Date   • ANAL FISSURECTOMY     • CHOLECYSTECTOMY      laparoscopy   • EPIDURAL BLOCK INJECTION N/A 12/29/2016    Procedure: BLOCK / INJECTION EPIDURAL STEROID CERVICAL  C7-T1;  Surgeon: Richy Davis MD;  Location: [address] [place] [address] MAIN OR;  Service:    • EPIDURAL BLOCK INJECTION N/A 11/02/2016    Procedure: BLOCK / INJECTION EPIDURAL STEROID CERVICAL  C7-T1;  Surgeon: Richy Davis MD;  Location: [address] [place] [address] MAIN OR;  Service:    • FRACTURE SURGERY      right ankle   • HYSTERECTOMY     • OOPHORECTOMY Bilateral    • OR EXCISION GANGLION WRIST DORSAL/VOLAR PRIMARY Right 12/29/2021    Procedure: EXCISION GANGLION CYST RIGHT HAND;  Surgeon: Alejandro Iqbal DO;  Location: [facility];  Service: Orthopedics   • [address] [place] [address] RIGIDUS W/CHEILECTOMY 1ST MP JT W/O IMPLT Left 4/14/2023    Procedure: CHEILECTOMY LEFT GREAT TOE JOINT;  Surgeon: Too Castillo DPM;  Location:  MAIN OR;  Service: Podiatry   • MT TENDON SHEATH INCISION Right 12/29/2021    Procedure: RIGHT LONG FINGER A 1 PULLEY RELEASE,;  Surgeon: Adriana Floyd DO;  Location: [facility] MAIN OR;  Service: Orthopedics   • ROTATOR CUFF REPAIR Right         ALLERGIES:  Other, Percocet [oxycodone-acetaminophen], and Celecoxib    MEDICATIONS:  Current Outpatient Medications   Medication Sig Dispense Refill   • escitalopram (LEXAPRO) 10 mg tablet Take 1 tablet (10 mg total) by mouth daily 90 tablet 1   • estradiol (ESTRACE VAGINAL) 0.1 mg/g vaginal cream Insert 1 g into the vagina 3 (three) times a week 42.5 g 3   • famciclovir (FAMVIR) 500 mg tablet Take 500 mg by mouth if needed     • MAGNESIUM CITRATE PO Take by mouth in the morning     • rizatriptan (MAXALT) 10 mg tablet 1 po q2hrs prn migraine, 30mg/max in 24 hrs 9 tablet 5     No current facility-administered medications for this visit.        SOCIAL HISTORY:  Social History     Socioeconomic History   • Marital status: /Civil Union     Spouse name: None   • Number of children: None   • Years of education: None   • Highest education level: None   Occupational History   • None   Tobacco Use   • Smoking status: Former     Packs/day: 0.25     Years: 40.00     Total pack years: 10.00     Types: Cigarettes     Start date: 37     Quit date: 1/11/2020     Years since quitting: 3.6   • Smokeless tobacco: Never   Vaping Use   • Vaping Use: Never used   Substance and Sexual Activity   • Alcohol use: Not Currently     Comment: social   • Drug use: No   • Sexual activity: Yes     Partners: Male   Other Topics Concern   • None   Social History Narrative   • None     Social Determinants of Health     Financial Resource Strain: Not on file   Food Insecurity: Not on file   Transportation Needs: Not on file   Physical Activity: Not on file Stress: Not on file   Social Connections: Not on file   Intimate Partner Violence: Not on file   Housing Stability: Not on file        Review of Systems   Constitutional: Negative. HENT: Negative. Eyes: Negative. Respiratory: Negative. Cardiovascular: Negative. Endocrine: Negative. Musculoskeletal: Negative. Neurological: Negative. Hematological: Negative. Psychiatric/Behavioral: Negative. Objective:     Physical Exam  Constitutional:       Appearance: Normal appearance. HENT:      Head: Normocephalic and atraumatic. Nose: Nose normal.   Cardiovascular:      Pulses:           Dorsalis pedis pulses are 2+ on the left side. Posterior tibial pulses are 2+ on the left side. Pulmonary:      Effort: Pulmonary effort is normal.   Feet:      Left foot:      Skin integrity: Skin integrity normal.      Comments: On examination today, the incision line to the left first MTPJ is well-healed; there is no erythema nor edema to the left foot. Passive range of motion of the left great toe joint is satisfactory. Skin:     General: Skin is warm. Capillary Refill: Capillary refill takes less than 2 seconds. Neurological:      General: No focal deficit present. Mental Status: She is alert and oriented to person, place, and time. Psychiatric:         Mood and Affect: Mood normal.         Behavior: Behavior normal.         Thought Content:  Thought content normal.

## 2023-11-06 ENCOUNTER — NURSE TRIAGE (OUTPATIENT)
Age: 63
End: 2023-11-06

## 2023-11-06 NOTE — TELEPHONE ENCOUNTER
Patient called returning call to us. Teams was made aware, but they were on another call. I told patient that she would get a call back.     CB: [phone]

## 2023-11-06 NOTE — TELEPHONE ENCOUNTER
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

## 2023-11-06 NOTE — TELEPHONE ENCOUNTER
Regarding: Kidney pain  ----- Message from [facility] sent at 11/6/2023 10:29 AM EST -----  Patient calling for recommendations. Patient has been having pain in her kidney area. She stated it is more on her right side and is on and off. She will feel it more when she does a lot of movement. Patient requesting a call back at [phone] and will be unavailable from 1-4 pm today.

## 2023-11-06 NOTE — TELEPHONE ENCOUNTER
No contact first attempt     Reason for Disposition   Message left on unidentified voice mail. Phone number verified. No answer. First attempt to contact caller. Follow-up call scheduled within 15 minutes.     Protocols used: No Contact or Duplicate Contact Call-ADULT-[place]

## 2023-11-06 NOTE — TELEPHONE ENCOUNTER
Patient called in regards to right side kidney pain mid/lower back at times radiates for the past 3 weeks w/burning off and off while urinating. Denies any visible hematuria in urine . Patient still is taking Estrace as prescribe and has standing order for urine testing. Advised patient to go for testing, stay well hydrated, went over ED protocol. Answer Questions - Initial Assessment   When did this pain start: 3 weeks go    Where is your pain: mid lower back right side  Is your pain on the left, right, or both sides: right    Does your pain radiate anywhere: pain radiates    Can you qualify the pain: aching    Do you have nausea or vomiting: no     Do you have a fever 101 or higher: No    Do you have any urinary symptoms: yes- burning 3 weeks ago off and on    Do you have a history of kidney stones or UTI's: yes    Have you had recent urologic procedure or surgery: no    Have you been to ER, urgent care, PCP or another specialist for this: no    Have you had recent imaging: no    Have you had any urine testing within the last week: no- but patient has a standing ordering  for urine testing    Protocols used:  FLANK PAIN / KIDNEY STONES / HYDRONEPHROSIS

## 2023-11-07 ENCOUNTER — TELEPHONE (OUTPATIENT)
Dept: UROLOGY | Facility: CLINIC | Age: 63
End: 2023-11-07

## 2023-11-07 ENCOUNTER — APPOINTMENT (OUTPATIENT)
Dept: LAB | Facility: HOSPITAL | Age: 63
End: 2023-11-07
Payer: COMMERCIAL

## 2023-11-07 DIAGNOSIS — N30.00 ACUTE CYSTITIS WITHOUT HEMATURIA: Primary | ICD-10-CM

## 2023-11-07 LAB
BACTERIA UR QL AUTO: ABNORMAL /HPF
BILIRUB UR QL STRIP: NEGATIVE
CLARITY UR: CLEAR
COLOR UR: ABNORMAL
GLUCOSE UR STRIP-MCNC: NEGATIVE MG/DL
HGB UR QL STRIP.AUTO: ABNORMAL
KETONES UR STRIP-MCNC: NEGATIVE MG/DL
LEUKOCYTE ESTERASE UR QL STRIP: NEGATIVE
NITRITE UR QL STRIP: NEGATIVE
NON-SQ EPI CELLS URNS QL MICRO: ABNORMAL /HPF
PH UR STRIP.AUTO: 6 [PH]
PROT UR STRIP-MCNC: NEGATIVE MG/DL
RBC #/AREA URNS AUTO: ABNORMAL /HPF
SP GR UR STRIP.AUTO: 1.02 (ref 1–1.03)
UROBILINOGEN UR QL STRIP.AUTO: 0.2 E.U./DL
WBC #/AREA URNS AUTO: ABNORMAL /HPF

## 2023-11-07 RX ORDER — CEPHALEXIN 500 MG/1
500 CAPSULE ORAL EVERY 8 HOURS SCHEDULED
Qty: 15 CAPSULE | Refills: 0 | Status: SHIPPED | OUTPATIENT
Start: 2023-11-07 | End: 2023-11-12

## 2023-11-07 NOTE — TELEPHONE ENCOUNTER
Spoke to pt let her know Moshe Gray sent Keflex to pharmacy     ----- Message from Moshe Gray PA-C sent at 11/7/2023  8:28 AM EST -----  Keflex sent to pharmacy.   Please monitor for culture

## 2023-11-07 NOTE — TELEPHONE ENCOUNTER
Agree with above recommendations for urine testing. Please monitor for final results. ER precautions.  Thanks

## 2023-11-10 LAB
BACTERIA UR CULT: ABNORMAL
BACTERIA UR CULT: ABNORMAL

## 2023-11-16 DIAGNOSIS — A49.8 GARDNERELLA INFECTION: Primary | ICD-10-CM

## 2023-11-16 RX ORDER — METRONIDAZOLE 500 MG/1
500 TABLET ORAL EVERY 12 HOURS SCHEDULED
Qty: 14 TABLET | Refills: 0 | Status: SHIPPED | OUTPATIENT
Start: 2023-11-16 | End: 2023-11-23

## 2023-12-13 ENCOUNTER — OFFICE VISIT (OUTPATIENT)
Dept: PODIATRY | Facility: CLINIC | Age: 63
End: 2023-12-13
Payer: COMMERCIAL

## 2023-12-13 VITALS
BODY MASS INDEX: 25.8 KG/M2 | WEIGHT: 164.4 LBS | HEIGHT: 67 IN | SYSTOLIC BLOOD PRESSURE: 109 MMHG | HEART RATE: 64 BPM | DIASTOLIC BLOOD PRESSURE: 76 MMHG

## 2023-12-13 DIAGNOSIS — M77.52 BURSITIS OF POSTERIOR HEEL, LEFT: Primary | ICD-10-CM

## 2023-12-13 DIAGNOSIS — M20.5X2 HALLUX LIMITUS, LEFT: ICD-10-CM

## 2023-12-13 DIAGNOSIS — M77.8 ENTHESOPATHY OF LEFT FOOT: ICD-10-CM

## 2023-12-13 PROCEDURE — 20600 DRAIN/INJ JOINT/BURSA W/O US: CPT | Performed by: PODIATRIST

## 2023-12-13 PROCEDURE — 99213 OFFICE O/P EST LOW 20 MIN: CPT | Performed by: PODIATRIST

## 2023-12-13 RX ORDER — BUPIVACAINE HYDROCHLORIDE 2.5 MG/ML
1 INJECTION, SOLUTION EPIDURAL; INFILTRATION; INTRACAUDAL
Status: SHIPPED | OUTPATIENT
Start: 2023-12-13

## 2023-12-13 RX ORDER — TRIAMCINOLONE ACETONIDE 40 MG/ML
20 INJECTION, SUSPENSION INTRA-ARTICULAR; INTRAMUSCULAR
Status: SHIPPED | OUTPATIENT
Start: 2023-12-13

## 2023-12-13 RX ADMIN — BUPIVACAINE HYDROCHLORIDE 1 ML: 2.5 INJECTION, SOLUTION EPIDURAL; INFILTRATION; INTRACAUDAL at 16:00

## 2023-12-13 RX ADMIN — TRIAMCINOLONE ACETONIDE 20 MG: 40 INJECTION, SUSPENSION INTRA-ARTICULAR; INTRAMUSCULAR at 16:00

## 2023-12-14 NOTE — PROGRESS NOTES
Assessment/Plan:     The patient's clinical examination today significant for minimal tenderness with palpation of the right great toe joint. There is moderate tenderness palpation to the posterior aspect of the left heel at the insertion site of the Achilles tendon. Palpable spurring is noted posteriorly. There is tenderness with lateral squeeze of the retrocalcaneal bursa. There is no erythema nor edema no calor or ecchymosis to the left lower extremity. The patient has noted persistent tenderness to the posterior left heel since April. Her clinical examination is consistent with retrocalcaneal bursitis and enthesopathy of the posterior left heel. We decided to proceed with injecting the retrocalcaneal bursa today. After prepping the skin with alcohol, a CSI was administered to the left retrocalcaneal bursa without complication. The injection was well-tolerated. Recommend follow-up in 4 to 6 weeks or as needed depending on how she responds to the steroid injection. Diagnoses and all orders for this visit:    Bursitis of posterior heel, left    Enthesopathy of left foot    Hallux limitus, left    Other orders  -     Foot/lower extremity injection          Subjective:     Patient ID: Randal Amin is a 59 y.o. female. The patient presents today for follow-up status post left great toe joint cheilectomy. She still notes some occasional soreness in the left great toe joint but overall his doing fairly well. Her main issue and complaint today is chronic tenderness to the posterior left heel since April. She states that there is tenderness that occurs on a daily basis. It typically does ease up after she has been on it for a while but becomes tender when getting up after periods of rest.  There has been no interval injury or trauma to the left lower extremity.       PAST MEDICAL HISTORY:  Past Medical History:   Diagnosis Date    Migraines     Neck pain     Osteopenia     Seasonal allergic reaction        PAST SURGICAL HISTORY:  Past Surgical History:   Procedure Laterality Date    ANAL FISSURECTOMY      CHOLECYSTECTOMY      laparoscopy    EPIDURAL BLOCK INJECTION N/A 12/29/2016    Procedure: BLOCK / INJECTION EPIDURAL STEROID CERVICAL  C7-T1;  Surgeon: Vidhi Corbett MD;  Location: [address] [place] [address] MAIN OR;  Service:     EPIDURAL BLOCK INJECTION N/A 11/02/2016    Procedure: BLOCK / INJECTION EPIDURAL STEROID CERVICAL  C7-T1;  Surgeon: Vidhi Corbett MD;  Location: [address] [place] [address] MAIN OR;  Service:     FRACTURE SURGERY      right ankle    HYSTERECTOMY      OOPHORECTOMY Bilateral     AZ EXCISION GANGLION WRIST DORSAL/VOLAR PRIMARY Right 12/29/2021    Procedure: EXCISION GANGLION CYST RIGHT HAND;  Surgeon: Dallas Self DO;  Location: [facility];  Service: Orthopedics    AZ HALLUX RIGIDUS W/CHEILECTOMY 1ST MP JT W/O IMPLT Left 4/14/2023    Procedure: CHEILECTOMY LEFT GREAT TOE JOINT;  Surgeon: Marcos Nielson DPM;  Location:  MAIN OR;  Service: Podiatry    AZ TENDON SHEATH INCISION Right 12/29/2021    Procedure: RIGHT LONG FINGER A 1 PULLEY RELEASE,;  Surgeon: Dallas Self DO;  Location: [facility] MAIN OR;  Service: Orthopedics    ROTATOR CUFF REPAIR Right         ALLERGIES:  Other, Percocet [oxycodone-acetaminophen], and Celecoxib    MEDICATIONS:  Current Outpatient Medications   Medication Sig Dispense Refill    escitalopram (LEXAPRO) 10 mg tablet Take 1 tablet (10 mg total) by mouth daily 90 tablet 1    estradiol (ESTRACE VAGINAL) 0.1 mg/g vaginal cream Insert 1 g into the vagina 3 (three) times a week 42.5 g 3    famciclovir (FAMVIR) 500 mg tablet Take 500 mg by mouth if needed      rizatriptan (MAXALT) 10 mg tablet 1 po q2hrs prn migraine, 30mg/max in 24 hrs 9 tablet 5    MAGNESIUM CITRATE PO Take by mouth in the morning       No current facility-administered medications for this visit.        SOCIAL HISTORY:  Social History     Socioeconomic History    Marital status: /Civil Union     Spouse name: None Number of children: None    Years of education: None    Highest education level: None   Occupational History    None   Tobacco Use    Smoking status: Former     Current packs/day: 0.00     Average packs/day: 0.3 packs/day for 40.0 years (10.0 ttl pk-yrs)     Types: Cigarettes     Start date: 37     Quit date: 1/11/2020     Years since quitting: 3.9    Smokeless tobacco: Never   Vaping Use    Vaping status: Never Used   Substance and Sexual Activity    Alcohol use: Not Currently     Comment: social    Drug use: No    Sexual activity: Yes     Partners: Male   Other Topics Concern    None   Social History Narrative    None     Social Determinants of Health     Financial Resource Strain: Not on file   Food Insecurity: Not on file   Transportation Needs: Not on file   Physical Activity: Not on file   Stress: Not on file   Social Connections: Not on file   Intimate Partner Violence: Not on file   Housing Stability: Not on file        Review of Systems   Constitutional: Negative. HENT: Negative. Eyes: Negative. Respiratory: Negative. Cardiovascular: Negative. Endocrine: Negative. Musculoskeletal: Negative. Neurological: Negative. Hematological: Negative. Psychiatric/Behavioral: Negative. Objective:     Physical Exam  Constitutional:       Appearance: Normal appearance. HENT:      Head: Normocephalic and atraumatic. Nose: Nose normal.   Cardiovascular:      Pulses:           Dorsalis pedis pulses are 2+ on the left side. Posterior tibial pulses are 2+ on the left side. Pulmonary:      Effort: Pulmonary effort is normal.   Musculoskeletal:        Feet:    Feet:      Left foot:      Skin integrity: Skin integrity normal.      Comments: The patient's clinical examination today significant for minimal tenderness with palpation of the right great toe joint.   There is moderate tenderness palpation to the posterior aspect of the left heel at the insertion site of the Achilles tendon. Palpable spurring is noted posteriorly. There is tenderness with lateral squeeze of the retrocalcaneal bursa. There is no erythema nor edema no calor or ecchymosis to the left lower extremity. Skin:     General: Skin is warm. Capillary Refill: Capillary refill takes less than 2 seconds. Neurological:      General: No focal deficit present. Mental Status: She is alert and oriented to person, place, and time. Psychiatric:         Mood and Affect: Mood normal.         Behavior: Behavior normal.         Thought Content: Thought content normal.         Foot/lower extremity injection    Performed by: Armida Carvalho DPM  Authorized by: Armida Carvalho DPM    Procedure:      Other Assisting Provider: No      Verbal consent obtained?: Yes      Risks and benefits: Risks, benefits and alternatives were discussed      Consent given by:  Patient    Time out: Immediately prior to the procedure a time out was called      Time out performed at:  12/13/2023 4:05 PM    Patient states understanding of procedure being performed: Yes      Patient's understanding of procedure matches consent: Yes      Patient identity confirmed:  Verbally with patient and provided demographic data    Supporting Documentation:     Indications:  Pain    Procedure Details:    Prep: patient was prepped and draped in usual sterile fashion                Ethyl Chloride was applied      Needle size: 25 G G    Ultrasound Guidance: no      Approach:  Medial    Laterality:  Left    Cyst Aspiration/Injection: No      Location comment:  Left retrocalcaneal bursa    Injection Information:       Medications:  1 mL bupivacaine (PF) 0.25 %; 20 mg triamcinolone acetonide 40 mg/mL    Patient tolerance:  Patient tolerated the procedure well with no immediate complications

## 2023-12-21 ENCOUNTER — TELEPHONE (OUTPATIENT)
Age: 63
End: 2023-12-21

## 2023-12-21 DIAGNOSIS — N30.00 ACUTE CYSTITIS WITHOUT HEMATURIA: Primary | ICD-10-CM

## 2023-12-21 RX ORDER — NITROFURANTOIN 25; 75 MG/1; MG/1
100 CAPSULE ORAL 2 TIMES DAILY
Qty: 10 CAPSULE | Refills: 0 | Status: SHIPPED | OUTPATIENT
Start: 2023-12-21 | End: 2023-12-26

## 2023-12-21 NOTE — TELEPHONE ENCOUNTER
----- Message from Jayda Clayton sent at 12/21/2023  9:26 AM EST -----  Regarding: Results of urine culture   Contact: [phone]  Carlos Leahy. I was wondering if you could call in a RX for nitrofuritan. I don’t have an infection or anything right now, but always have this on hand in case one shows up. It just helps with the pain at the beginning and I usually jolly it off quickly. It’s just a peace of mind for me. Please let me know if this is possible. Thank you and have a Marina Belcher

## 2024-02-26 ENCOUNTER — APPOINTMENT (OUTPATIENT)
Dept: LAB | Facility: HOSPITAL | Age: 64
End: 2024-02-26
Payer: COMMERCIAL

## 2024-02-26 ENCOUNTER — NURSE TRIAGE (OUTPATIENT)
Age: 64
End: 2024-02-26

## 2024-02-26 DIAGNOSIS — N30.00 ACUTE CYSTITIS WITHOUT HEMATURIA: Primary | ICD-10-CM

## 2024-02-26 DIAGNOSIS — N30.00 ACUTE CYSTITIS WITHOUT HEMATURIA: ICD-10-CM

## 2024-02-26 LAB
BACTERIA UR QL AUTO: ABNORMAL /HPF
BILIRUB UR QL STRIP: NEGATIVE
CLARITY UR: CLEAR
COLOR UR: YELLOW
GLUCOSE UR STRIP-MCNC: NEGATIVE MG/DL
HGB UR QL STRIP.AUTO: ABNORMAL
KETONES UR STRIP-MCNC: NEGATIVE MG/DL
LEUKOCYTE ESTERASE UR QL STRIP: NEGATIVE
NITRITE UR QL STRIP: NEGATIVE
NON-SQ EPI CELLS URNS QL MICRO: ABNORMAL /HPF
PH UR STRIP.AUTO: 7 [PH]
PROT UR STRIP-MCNC: NEGATIVE MG/DL
RBC #/AREA URNS AUTO: ABNORMAL /HPF
SP GR UR STRIP.AUTO: 1.02 (ref 1–1.03)
UROBILINOGEN UR QL STRIP.AUTO: 0.2 E.U./DL
WBC #/AREA URNS AUTO: ABNORMAL /HPF

## 2024-02-26 PROCEDURE — 81001 URINALYSIS AUTO W/SCOPE: CPT

## 2024-02-26 PROCEDURE — 87086 URINE CULTURE/COLONY COUNT: CPT

## 2024-02-26 PROCEDURE — 87077 CULTURE AEROBIC IDENTIFY: CPT

## 2024-02-26 PROCEDURE — 87186 SC STD MICRODIL/AGAR DIL: CPT

## 2024-02-26 NOTE — TELEPHONE ENCOUNTER
Spoke to patient and she has IC history and had intercourse on Friday. She states she woke up over the weekend with urinary frequency, urgency and abdominal pressure. She is voiding but voiding little amounts. She denies any N/V fevers chills or sweats. She is hydrating well. No constipation. She has been voiding better since drinking more water. She was given care advice to continue to hydrate with water, lemon water or cranberry juice. She is to avoid bladder irritants. Urine orders placed for her to go to lab today. She was reviewed ER and UC precautions. Please advise of any further recommendations. She is aware of the 48 hour turn around time for results. Please use listed pharmacy in [place].

## 2024-02-26 NOTE — TELEPHONE ENCOUNTER
"Reason for Disposition  • Urinating more frequently than usual (i.e., frequency)  • All other urine symptoms    Answer Assessment - Initial Assessment Questions  1. SYMPTOM: \"What's the main symptom you're concerned about?\" (e.g., frequency, incontinence)      Urinary frequency and urgency and burning  2. ONSET: \"When did the  symptoms  start?\"      Friday   3. PAIN: \"Is there any pain?\" If Yes, ask: \"How bad is it?\" (Scale: 1-10; mild, moderate, severe)      Pressure in abdomen.  4. CAUSE: \"What do you think is causing the symptoms?\"      H/O uti's and intercourse  5. OTHER SYMPTOMS: \"Do you have any other symptoms?\" (e.g., fever, flank pain, blood in urine, pain with urination)      Chills but no fevers    Protocols used: Urinary Symptoms-ADULT-[place]    "

## 2024-02-26 NOTE — TELEPHONE ENCOUNTER
Agree with above recommendations with conservative measures and urine testing. Please monitor for results. ER precautions. Thanks

## 2024-02-26 NOTE — TELEPHONE ENCOUNTER
Regarding: uti symptoms  ----- Message from Jocelyn Burrell sent at 2/26/2024  8:10 AM EST -----  Pt history of UTI    PT complaining of bladder pressure, urgency, frequency burning.    Pt stated that it started over the weekend.  Pt stated that also the last time she had nitrofurantoin she got a migraine       Please call pt back at  [phone]

## 2024-02-27 NOTE — TELEPHONE ENCOUNTER
Patient called stating still symptomatic; chills, burning, pressure. Requesting abx. Advised still waiting on culture results to come back.      # [phone]

## 2024-02-27 NOTE — TELEPHONE ENCOUNTER
Urine testing positive for blood. Negative for nitrites or leukocytes. Will await final culture. Please monitor. Thanks

## 2024-02-28 DIAGNOSIS — N30.00 ACUTE CYSTITIS WITHOUT HEMATURIA: Primary | ICD-10-CM

## 2024-02-28 DIAGNOSIS — N30.11 INTERSTITIAL CYSTITIS (CHRONIC) WITH HEMATURIA: ICD-10-CM

## 2024-02-28 LAB — BACTERIA UR CULT: ABNORMAL

## 2024-02-28 RX ORDER — ESTRADIOL 0.1 MG/G
1 CREAM VAGINAL 3 TIMES WEEKLY
Qty: 42.5 G | Refills: 0 | Status: SHIPPED | OUTPATIENT
Start: 2024-02-28

## 2024-02-28 RX ORDER — CEPHALEXIN 500 MG/1
500 CAPSULE ORAL EVERY 12 HOURS SCHEDULED
Qty: 14 CAPSULE | Refills: 0 | Status: SHIPPED | OUTPATIENT
Start: 2024-02-28 | End: 2024-03-06

## 2024-02-28 RX ORDER — AMOXICILLIN AND CLAVULANATE POTASSIUM 875; 125 MG/1; MG/1
1 TABLET, FILM COATED ORAL EVERY 12 HOURS SCHEDULED
Qty: 14 TABLET | Refills: 0 | Status: SHIPPED | OUTPATIENT
Start: 2024-02-28 | End: 2024-02-28

## 2024-02-28 NOTE — TELEPHONE ENCOUNTER
Advised patient of medication sent. She asked what the difference is between  keflex and the augmentin Advised that they're two different antibiotics. She reports previous reactions to antibiotics (not documented under allergies) Advised if she is concerned with having side effects of the augmentin and previous reactions to other medications then It would be wise to just wait for the finalized culture.     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

## 2024-02-28 NOTE — TELEPHONE ENCOUNTER
Patient advised of medication sent and directions for taking. Confirmed pharmacy sent to. Patient verbalized understanding and knows to call office with any further concerns

## 2024-03-28 ENCOUNTER — TELEPHONE (OUTPATIENT)
Age: 64
End: 2024-03-28

## 2024-03-28 DIAGNOSIS — N30.11 INTERSTITIAL CYSTITIS (CHRONIC) WITH HEMATURIA: Primary | ICD-10-CM

## 2024-03-28 NOTE — TELEPHONE ENCOUNTER
Pt called stated that she has UTI and she was on med that did not seems to help.     Pt scheduled an appt on 5/7 and requested an order for urine test to check for infection.     Please review

## 2024-04-19 ENCOUNTER — PATIENT MESSAGE (OUTPATIENT)
Dept: FAMILY MEDICINE CLINIC | Facility: CLINIC | Age: 64
End: 2024-04-19

## 2024-04-19 DIAGNOSIS — Z12.31 SCREENING MAMMOGRAM FOR BREAST CANCER: Primary | ICD-10-CM

## 2024-05-07 ENCOUNTER — OFFICE VISIT (OUTPATIENT)
Dept: UROLOGY | Facility: CLINIC | Age: 64
End: 2024-05-07
Payer: COMMERCIAL

## 2024-05-07 ENCOUNTER — TELEPHONE (OUTPATIENT)
Dept: UROLOGY | Facility: CLINIC | Age: 64
End: 2024-05-07

## 2024-05-07 VITALS
HEIGHT: 67 IN | OXYGEN SATURATION: 97 % | HEART RATE: 82 BPM | DIASTOLIC BLOOD PRESSURE: 80 MMHG | WEIGHT: 166 LBS | SYSTOLIC BLOOD PRESSURE: 120 MMHG | BODY MASS INDEX: 26.06 KG/M2

## 2024-05-07 DIAGNOSIS — N30.11 INTERSTITIAL CYSTITIS (CHRONIC) WITH HEMATURIA: Primary | ICD-10-CM

## 2024-05-07 DIAGNOSIS — R39.15 URGENCY OF URINATION: ICD-10-CM

## 2024-05-07 LAB
POST-VOID RESIDUAL VOLUME, ML POC: 7 ML
SL AMB  POCT GLUCOSE, UA: NORMAL
SL AMB LEUKOCYTE ESTERASE,UA: NORMAL
SL AMB POCT BILIRUBIN,UA: NORMAL
SL AMB POCT BLOOD,UA: NORMAL
SL AMB POCT CLARITY,UA: CLEAR
SL AMB POCT COLOR,UA: YELLOW
SL AMB POCT KETONES,UA: NORMAL
SL AMB POCT NITRITE,UA: NORMAL
SL AMB POCT PH,UA: 6
SL AMB POCT SPECIFIC GRAVITY,UA: 1005
SL AMB POCT URINE PROTEIN: NORMAL
SL AMB POCT UROBILINOGEN: 0.2

## 2024-05-07 PROCEDURE — 99204 OFFICE O/P NEW MOD 45 MIN: CPT | Performed by: UROLOGY

## 2024-05-07 PROCEDURE — 51798 US URINE CAPACITY MEASURE: CPT | Performed by: UROLOGY

## 2024-05-07 PROCEDURE — 81002 URINALYSIS NONAUTO W/O SCOPE: CPT | Performed by: UROLOGY

## 2024-05-07 PROCEDURE — 87086 URINE CULTURE/COLONY COUNT: CPT | Performed by: UROLOGY

## 2024-05-07 NOTE — PROGRESS NOTES
Jayda Clayton is a(n) 63 y.o. female. , :  1960    Subjective     Assessment:  There were no encounter diagnoses.  63-year-old woman with a complicated history of persistent microscopic hematuria with negative workup, postmenopausal atrophic vaginitis, urinary frequency and urgency and sense of urinary infections.  She self medicates with Azo.  She has d-mannose pills which are not sure how often she takes.  She never has been evaluated for pelvic floor abnormalities despite her prior gynecologic surgery with what is reported as a bladder sling.  Maybe she has intense floor gives her all of her symptoms.  I do not see very many urinary infections in her history.  She still needs a cystoscopy because of the hematuria as her last scope was in 2019.  Plan: Cystoscopy, send in standing order for urine culture.  Will send her urine for culture today.  Leukocytes was negative but red cells were present.  I just want to prove that she does not need antibiotics currently.  She was offered Vesicare but really does not want to go on pills.  She would prefer to just go frequently.   Radiology  2020 CT abdomen pelvis renal protocol  normal     History  Atrophic vaginitis.  History of vestibulitis and advised estrogen cream.  Microscopic hematuria.  Had evaluation at [facility] in  and  with no suspicious findings. Ex smoker  History of endometriosis with hysterectomy removal for ectomy.  Postmenopausal mention of interstitial cystitis for which she takes Azo flareups.  Frequency and Urgency. No desire for meds 2024.      Prior Visits  2023 [facility]  Hx of  Microscopic hematuria. Followed with urogyn through [facility]. Cystoscopy  negative for an  abnormalities. Prior hematuria workup .   CT renal (10/22/2020) mild right renal fullness.      Prior hx of IC and atrophic vaginitis.   Hx of hysterectomy + b/l oophrectomy d/t endometriosis. Does believe she had a bladder sling  "placed.    Past history of tobacco use, approximate 15 pack year hx. Quit a number of years ago.      Previously reported increasing dysuria and pelvic discomfort. No gross hematuria. Started on estrace with improvement.     When she gets a flair of symptoms, she'll take AZO bladder control with good symptom management.   Has occasional need to double void and post void dribbling.      UA (2/20/2023) negative for microscopic hematuria.    5/7/2024 CINTHIA Deny  63-year-old woman with a complicated history of persistent microscopic hematuria with negative workup, postmenopausal atrophic vaginitis, urinary frequency and urgency and sense of urinary infections.  She self medicates with Azo.  She has d-mannose pills which are not sure how often she takes.  She never has been evaluated for pelvic floor abnormalities despite her prior gynecologic surgery with what is reported as a bladder sling.  Maybe she has intense floor gives her all of her symptoms.  I do not see very many urinary infections in her history.  She still needs a cystoscopy because of the hematuria as her last scope was in 2019.  Plan: Cystoscopy, send in standing order for urine culture.  Will send her urine for culture today.  Leukocytes was negative but red cells were present.  I just want to prove that she does not need antibiotics currently.  She was offered Vesicare but really does not want to go on pills.  She would prefer to just go frequently.    Review of Systems    No results found for: \"PSA\"  No results found for: \"TESTOSTERONE\"  No components found for: \"CR\"  No results found for: \"HBA1C\"    Objective     /80 (BP Location: Left arm, Patient Position: Sitting, Cuff Size: Standard)   Pulse 82   Ht 5' 7\" (1.702 m)   Wt 75.3 kg (166 lb)   SpO2 97%   BMI 26.00 kg/m²     Physical Exam      [facility]3]  "

## 2024-05-07 NOTE — TELEPHONE ENCOUNTER
Spoke to pt to see if she can get urine culture done this morning before visit today at 3pm pt stated she is not having any urinary symptoms

## 2024-05-08 ENCOUNTER — OFFICE VISIT (OUTPATIENT)
Dept: FAMILY MEDICINE CLINIC | Facility: CLINIC | Age: 64
End: 2024-05-08
Payer: COMMERCIAL

## 2024-05-08 ENCOUNTER — APPOINTMENT (OUTPATIENT)
Dept: RADIOLOGY | Facility: CLINIC | Age: 64
End: 2024-05-08
Payer: COMMERCIAL

## 2024-05-08 VITALS
TEMPERATURE: 98 F | HEART RATE: 60 BPM | RESPIRATION RATE: 17 BRPM | HEIGHT: 67 IN | DIASTOLIC BLOOD PRESSURE: 60 MMHG | WEIGHT: 164.8 LBS | SYSTOLIC BLOOD PRESSURE: 102 MMHG | BODY MASS INDEX: 25.87 KG/M2

## 2024-05-08 DIAGNOSIS — R53.83 OTHER FATIGUE: ICD-10-CM

## 2024-05-08 DIAGNOSIS — R07.89 CHEST PRESSURE: ICD-10-CM

## 2024-05-08 DIAGNOSIS — Z13.1 SCREENING FOR DIABETES MELLITUS (DM): ICD-10-CM

## 2024-05-08 DIAGNOSIS — Z13.220 SCREENING FOR LIPID DISORDERS: ICD-10-CM

## 2024-05-08 DIAGNOSIS — R07.89 CHEST PRESSURE: Primary | ICD-10-CM

## 2024-05-08 LAB — BACTERIA UR CULT: NORMAL

## 2024-05-08 PROCEDURE — 71046 X-RAY EXAM CHEST 2 VIEWS: CPT

## 2024-05-08 PROCEDURE — 99214 OFFICE O/P EST MOD 30 MIN: CPT | Performed by: FAMILY MEDICINE

## 2024-05-08 NOTE — PROGRESS NOTES
Assessment/Plan:    No problem-specific Assessment & Plan notes found for this encounter.    Chest pressure  Check cxr  ER recurs    Fatigue  Check labs    Hypotension   Transient  If with bradycardia, suggest ER for eval of SSS  Stray hydrated     Diagnoses and all orders for this visit:    Chest pressure  -     XR chest pa & lateral; Future    Screening for diabetes mellitus (DM)  -     Comprehensive metabolic panel; Future    Screening for lipid disorders  -     Lipid Panel with Direct LDL reflex; Future    Other fatigue  -     CBC and differential; Future  -     TSH, 3rd generation; Future  -     Vitamin D 25 hydroxy; Future  -     Vitamin B12; Future        Return if symptoms worsen or fail to improve.    Subjective:      Patient ID: Jayda Clayton is a 63 y.o. female.    Chief Complaint   Patient presents with    Fatigue    Dizziness     Lightheaded and dizzy  Dory Cisneros, CMA     Hypotension     Low bp x 2 weeks         HPI  2d ago was fatigue, chills, lightheaded, weak  Home bp 98/48 unusual for her  Legs felt cold  Chest pressure  Did not go to ER  No changes in foods or anything  No n/v/c/d  Some sob  Stress test many years ago normal  No vertigo  Been drinking water  Stopped lexapro on own 1y ago  Maxalt prn  All symptoms resolved    The following portions of the patient's history were reviewed and updated as appropriate: allergies, current medications, past family history, past medical history, past social history, past surgical history and problem list.    Review of Systems   Constitutional:  Negative for chills and fever.   Neurological:  Negative for syncope.         Current Outpatient Medications   Medication Sig Dispense Refill    estradiol (ESTRACE VAGINAL) 0.1 mg/g vaginal cream Insert 1 g into the vagina 3 (three) times a week 42.5 g 0    famciclovir (FAMVIR) 500 mg tablet Take 500 mg by mouth if needed      rizatriptan (MAXALT) 10 mg tablet 1 po q2hrs prn migraine, 30mg/max in 24 hrs 9 tablet 5  "    Current Facility-Administered Medications   Medication Dose Route Frequency Provider Last Rate Last Admin    bupivacaine (PF) (MARCAINE) 0.25 % injection 1 mL  1 mL Infiltration  Abhishek Silvestre DPM   1 mL at 12/13/23 1600    triamcinolone acetonide (KENALOG-40) 40 mg/mL injection 20 mg  20 mg Infiltration  Abhishek Silvestre DPM   20 mg at 12/13/23 1600       Objective:    /60   Pulse 60   Temp 98 °F (36.7 °C)   Resp 17   Ht 5' 7\" (1.702 m)   Wt 74.8 kg (164 lb 12.8 oz)   BMI 25.81 kg/m²        Physical Exam  Vitals and nursing note reviewed.   Constitutional:       General: She is not in acute distress.     Appearance: She is well-developed. She is not ill-appearing.   HENT:      Head: Normocephalic.      Right Ear: Tympanic membrane normal.      Left Ear: Tympanic membrane normal.   Eyes:      General: No scleral icterus.     Conjunctiva/sclera: Conjunctivae normal.   Neck:      Vascular: No carotid bruit.   Cardiovascular:      Rate and Rhythm: Normal rate and regular rhythm.      Heart sounds: No murmur heard.  Pulmonary:      Effort: Pulmonary effort is normal. No respiratory distress.      Breath sounds: No wheezing.   Abdominal:      General: There is no distension.      Palpations: Abdomen is soft.      Tenderness: There is no abdominal tenderness.   Musculoskeletal:         General: No deformity.      Cervical back: Neck supple.   Skin:     General: Skin is warm and dry.      Coloration: Skin is not pale.   Neurological:      Mental Status: She is alert.      Motor: No weakness.      Gait: Gait normal.   Psychiatric:         Mood and Affect: Mood normal.         Behavior: Behavior normal.         Thought Content: Thought content normal.                Nikita Aponte, DO    "

## 2024-05-09 ENCOUNTER — APPOINTMENT (OUTPATIENT)
Dept: LAB | Facility: CLINIC | Age: 64
End: 2024-05-09
Payer: COMMERCIAL

## 2024-05-09 ENCOUNTER — PROCEDURE VISIT (OUTPATIENT)
Dept: UROLOGY | Facility: CLINIC | Age: 64
End: 2024-05-09
Payer: COMMERCIAL

## 2024-05-09 VITALS
OXYGEN SATURATION: 99 % | WEIGHT: 164 LBS | HEART RATE: 68 BPM | DIASTOLIC BLOOD PRESSURE: 70 MMHG | BODY MASS INDEX: 25.74 KG/M2 | HEIGHT: 67 IN | SYSTOLIC BLOOD PRESSURE: 110 MMHG

## 2024-05-09 DIAGNOSIS — Z13.220 SCREENING FOR LIPID DISORDERS: ICD-10-CM

## 2024-05-09 DIAGNOSIS — Z13.1 SCREENING FOR DIABETES MELLITUS (DM): ICD-10-CM

## 2024-05-09 DIAGNOSIS — R31.29 MICROSCOPIC HEMATURIA: ICD-10-CM

## 2024-05-09 DIAGNOSIS — R53.83 OTHER FATIGUE: ICD-10-CM

## 2024-05-09 DIAGNOSIS — R39.15 URGENCY OF URINATION: Primary | ICD-10-CM

## 2024-05-09 LAB
25(OH)D3 SERPL-MCNC: 37.4 NG/ML (ref 30–100)
ALBUMIN SERPL BCP-MCNC: 4.5 G/DL (ref 3.5–5)
ALP SERPL-CCNC: 70 U/L (ref 34–104)
ALT SERPL W P-5'-P-CCNC: 13 U/L (ref 7–52)
ANION GAP SERPL CALCULATED.3IONS-SCNC: 7 MMOL/L (ref 4–13)
AST SERPL W P-5'-P-CCNC: 16 U/L (ref 13–39)
BASOPHILS # BLD AUTO: 0.08 THOUSANDS/ÂΜL (ref 0–0.1)
BASOPHILS NFR BLD AUTO: 1 % (ref 0–1)
BILIRUB SERPL-MCNC: 0.84 MG/DL (ref 0.2–1)
BUN SERPL-MCNC: 12 MG/DL (ref 5–25)
CALCIUM SERPL-MCNC: 9.7 MG/DL (ref 8.4–10.2)
CHLORIDE SERPL-SCNC: 104 MMOL/L (ref 96–108)
CHOLEST SERPL-MCNC: 224 MG/DL
CO2 SERPL-SCNC: 27 MMOL/L (ref 21–32)
CREAT SERPL-MCNC: 0.68 MG/DL (ref 0.6–1.3)
EOSINOPHIL # BLD AUTO: 0.01 THOUSAND/ÂΜL (ref 0–0.61)
EOSINOPHIL NFR BLD AUTO: 0 % (ref 0–6)
ERYTHROCYTE [DISTWIDTH] IN BLOOD BY AUTOMATED COUNT: 12.1 % (ref 11.6–15.1)
GFR SERPL CREATININE-BSD FRML MDRD: 94 ML/MIN/1.73SQ M
GLUCOSE P FAST SERPL-MCNC: 92 MG/DL (ref 65–99)
HCT VFR BLD AUTO: 40 % (ref 34.8–46.1)
HDLC SERPL-MCNC: 62 MG/DL
HGB BLD-MCNC: 13.4 G/DL (ref 11.5–15.4)
IMM GRANULOCYTES # BLD AUTO: 0.02 THOUSAND/UL (ref 0–0.2)
IMM GRANULOCYTES NFR BLD AUTO: 0 % (ref 0–2)
LDLC SERPL CALC-MCNC: 145 MG/DL (ref 0–100)
LYMPHOCYTES # BLD AUTO: 1.67 THOUSANDS/ÂΜL (ref 0.6–4.47)
LYMPHOCYTES NFR BLD AUTO: 25 % (ref 14–44)
MCH RBC QN AUTO: 31.1 PG (ref 26.8–34.3)
MCHC RBC AUTO-ENTMCNC: 33.5 G/DL (ref 31.4–37.4)
MCV RBC AUTO: 93 FL (ref 82–98)
MONOCYTES # BLD AUTO: 0.54 THOUSAND/ÂΜL (ref 0.17–1.22)
MONOCYTES NFR BLD AUTO: 8 % (ref 4–12)
NEUTROPHILS # BLD AUTO: 4.27 THOUSANDS/ÂΜL (ref 1.85–7.62)
NEUTS SEG NFR BLD AUTO: 66 % (ref 43–75)
NRBC BLD AUTO-RTO: 0 /100 WBCS
PLATELET # BLD AUTO: 271 THOUSANDS/UL (ref 149–390)
PMV BLD AUTO: 11.1 FL (ref 8.9–12.7)
POTASSIUM SERPL-SCNC: 4 MMOL/L (ref 3.5–5.3)
PROT SERPL-MCNC: 7.3 G/DL (ref 6.4–8.4)
RBC # BLD AUTO: 4.31 MILLION/UL (ref 3.81–5.12)
SODIUM SERPL-SCNC: 138 MMOL/L (ref 135–147)
TRIGL SERPL-MCNC: 84 MG/DL
TSH SERPL DL<=0.05 MIU/L-ACNC: 2.12 UIU/ML (ref 0.45–4.5)
VIT B12 SERPL-MCNC: 239 PG/ML (ref 180–914)
WBC # BLD AUTO: 6.59 THOUSAND/UL (ref 4.31–10.16)

## 2024-05-09 PROCEDURE — 36415 COLL VENOUS BLD VENIPUNCTURE: CPT

## 2024-05-09 PROCEDURE — 52000 CYSTOURETHROSCOPY: CPT | Performed by: UROLOGY

## 2024-05-09 PROCEDURE — 80061 LIPID PANEL: CPT

## 2024-05-09 PROCEDURE — 82306 VITAMIN D 25 HYDROXY: CPT

## 2024-05-09 PROCEDURE — 85025 COMPLETE CBC W/AUTO DIFF WBC: CPT

## 2024-05-09 PROCEDURE — 82607 VITAMIN B-12: CPT

## 2024-05-09 PROCEDURE — 84443 ASSAY THYROID STIM HORMONE: CPT

## 2024-05-09 PROCEDURE — 80053 COMPREHEN METABOLIC PANEL: CPT

## 2024-05-09 RX ORDER — SOLIFENACIN SUCCINATE 10 MG/1
10 TABLET, FILM COATED ORAL DAILY
Qty: 30 TABLET | Refills: 5 | Status: SHIPPED | OUTPATIENT
Start: 2024-05-09 | End: 2024-11-05

## 2024-05-09 NOTE — PROGRESS NOTES
Cystoscopy     Date/Time  5/9/2024 9:00 AM     Performed by  Song Barclay MD   Authorized by  Song Barclay MD     Universal Protocol:  Consent: Written consent obtained.  Risks and benefits: risks, benefits and alternatives were discussed  Consent given by: patient  Patient identity confirmed: verbally with patient      Procedure Details:  Procedure type: cystoscopy    Patient tolerance: Patient tolerated the procedure well with no immediate complications    Additional Procedure Details: Indications: Persistent microscopic hematuria.  Signs and symptoms of urinary infection without definite infection.  Possible interstitial cystitis.    External: No prolapse of the urethra or bladder.    Urethra: No stricture    Bladder: No evidence of stones or tumors.

## 2024-05-09 NOTE — PROGRESS NOTES
Jayda Clayton is a(n) 63 y.o. female. , :  1960    Subjective     Assessment:  There were no encounter diagnoses.  Microscopic hematuria cystoscopy was negative again today.  No significant trabeculation to the bladder.  Might require diet modification.  Might require evaluation by gynecology.  Willing to try overactive bladder medication.    Plan:  Trial of Solifenacin 10 mg daily.  Side effects discussed.  Dry eyes dry mouth constipation blurry vision.  Follow-up if not significantly improved.     Radiology  2020 CT abdomen pelvis renal protocol  normal      History  Atrophic vaginitis.  History of vestibulitis and advised estrogen cream.  Microscopic hematuria.  Had evaluation at [facility] in  and  with no suspicious findings. Ex smoker  History of endometriosis with hysterectomy removal for ectomy.  Postmenopausal mention of interstitial cystitis for which she takes Azo flareups.  Frequency and Urgency. No desire for meds 2024.        Prior Visits  2023 [facility]  Hx of  Microscopic hematuria. Followed with urogyn through [facility]. Cystoscopy  negative for an  abnormalities. Prior hematuria workup .   CT renal (10/22/2020) mild right renal fullness.      Prior hx of IC and atrophic vaginitis.   Hx of hysterectomy + b/l oophrectomy d/t endometriosis. Does believe she had a bladder sling placed.    Past history of tobacco use, approximate 15 pack year hx. Quit a number of years ago.      Previously reported increasing dysuria and pelvic discomfort. No gross hematuria. Started on estrace with improvement.     When she gets a flair of symptoms, she'll take AZO bladder control with good symptom management.   Has occasional need to double void and post void dribbling.      UA (2023) negative for microscopic hematuria.     2024 OV Deny  63-year-old woman with a complicated history of persistent microscopic hematuria with negative workup, postmenopausal  "atrophic vaginitis, urinary frequency and urgency and sense of urinary infections.  She self medicates with Azo.  She has d-mannose pills which are not sure how often she takes.  She never has been evaluated for pelvic floor abnormalities despite her prior gynecologic surgery with what is reported as a bladder sling.  Maybe she has intense floor gives her all of her symptoms.  I do not see very many urinary infections in her history.  She still needs a cystoscopy because of the hematuria as her last scope was in 2019.  Plan: Cystoscopy, send in standing order for urine culture.  Will send her urine for culture today.  Leukocytes was negative but red cells were present.  I just want to prove that she does not need antibiotics currently.  She was offered Vesicare but really does not want to go on pills.  She would prefer to just go frequently.    5/9/2024 CINTHIA Deny  Here for cystoscopy.  Now is willing to try overactive bladder medicine.  She is concerned that she might have endometrial implants.  Advised to talk to gynecology about that.    Review of Systems    No results found for: \"PSA\"  No results found for: \"TESTOSTERONE\"  No components found for: \"CR\"  No results found for: \"HBA1C\"    Objective     /70 (BP Location: Left arm, Patient Position: Sitting, Cuff Size: Standard)   Pulse 68   Ht 5' 7\" (1.702 m)   Wt 74.4 kg (164 lb)   SpO2 99%   BMI 25.69 kg/m²     Physical Exam      Song Barclay [facility]  "

## 2024-05-24 ENCOUNTER — EVALUATION (OUTPATIENT)
Dept: PHYSICAL THERAPY | Facility: CLINIC | Age: 64
End: 2024-05-24
Payer: COMMERCIAL

## 2024-05-24 DIAGNOSIS — R39.15 URGENCY OF URINATION: ICD-10-CM

## 2024-05-24 DIAGNOSIS — M62.89 PELVIC FLOOR DYSFUNCTION IN FEMALE: Primary | ICD-10-CM

## 2024-05-24 DIAGNOSIS — N30.11 INTERSTITIAL CYSTITIS (CHRONIC) WITH HEMATURIA: ICD-10-CM

## 2024-05-24 PROCEDURE — 97162 PT EVAL MOD COMPLEX 30 MIN: CPT

## 2024-05-24 NOTE — PROGRESS NOTES
PT Evaluation     Today's date: 2024  Patient name: Jayda Clayton  : 1960  MRN: [id number]  Referring provider: Song Barclay MD  Dx:   Encounter Diagnosis     ICD-10-CM    1. Pelvic floor dysfunction in female  M62.89       2. Interstitial cystitis (chronic) with hematuria  N30.11 Ambulatory referral to Physical Therapy      3. Urgency of urination  R39.15 Ambulatory referral to Physical Therapy            Assessment  Impairments: abnormal coordination, abnormal muscle firing, abnormal or restricted ROM, activity intolerance, lacks appropriate home exercise program, poor posture  and poor body mechanics  Symptom irritability: moderate    Assessment details: Jayda Clayton is a 63 y.o. female  who presents to outpatient pelvic floor physical therapy with the following complaints: suprapubic pain, urge incontinence, urinary frequency. Patient's presentation is consistent with diagnosis of interstitial cystitis but also significant pelvic floor dysfunction, supported by findings from the examination including: significant tenderness to palpation at layers 1-3 of the pelvic floor muscles, evidence of breath holding/valsalva, possible rectocele, delayed relaxation. This patient is functionally limited in ability to manage intra-abominal pressure correctly, has fear of leaving home due to uncertainty of bathroom accessibility, and has difficulty with exercise and sexual activity due to pain. Jayda Clayton demonstrates good rehab potential and would benefit from skilled pelvic floor physical therapy to address the above complaints and functional limitations.    Secondary to financial constraints, patient will be following up with pelvic floor PT in one month to assess progress and to continue with rehab. Patient was given ample HEP and education regarding pelvic floor anatomy, function, results of examination, recommendations on ways to decrease pain with sexual activity, list of common bladder irritants,  diaphragmatic breathing practice and pelvic floor stretches.   Understanding of Dx/Px/POC: good     Prognosis: good    Goals  STG to be completed in 5 weeks:  Patient will demonstrate consistent water intake of  oz/day  Patient will demonstrate proper sequencing of DB and PFM lengthening  Patient will report 25% or better improvement in symptoms  Patient will report no more than 2/10 suprapubic/bladder pain with exercise  Patient will report at least 1.5 hour voiding intervals       LTG to be completed in 10 weeks:  Patient will demonstrate independence with comprehensive home exercise program.  Patient will report 75% or better improvement in symptoms  Patient will report no more than 1/10 discomfort with penetrative sexual activity  Patient will be able to void in 2-4 hour interval during daytime  Patient will report max 1 episode of nocturia per night  Patient will report no TTP during internal reassessment      Plan  Patient would benefit from: skilled physical therapy  Planned modality interventions: biofeedback, ultrasound, electrical stimulation/Russian stimulation, cryotherapy and thermotherapy: hydrocollator packs    Planned therapy interventions: abdominal trunk stabilization, activity modification, ADL retraining, balance/weight bearing training, behavior modification, body mechanics training, breathing training, massage, manual therapy, joint mobilization, Velez taping, nerve gliding, neuromuscular re-education, patient education, postural training, strengthening, stretching, therapeutic activities, functional ROM exercises, flexibility, fine motor coordination training, home exercise program and therapeutic exercise    Frequency: 1x week  Plan of Care beginning date: 5/24/2024  Plan of Care expiration date: 8/2/2024  Treatment plan discussed with: patient      PT Pelvic Floor Subjective:   History of Present Illness:   Patient presents with a history of urinary frequency (sometimes has to  stand up and sit back down to get it started), pain in the bladder area with certain foods/drinks, itchiness in the genital area.     Urinary habits: voids every hour on average. Sometimes requires standing up and sitting back down to start stream of urine, and characterizes it as a weak stream. 1-3 nocturia. Endorses feelings of incomplete voiding. Denies pain with urination. Endorses urinary incontinence which is very light dribbling with urge. Denies use of pads/diapers. Started taking vesicare 2 weeks ago.    Fluid intake: Water: >100oz, 48oz coffee    Diet and fiber intake: tries to stay away from gluten and sugar- has a garden and  hunts- high protein    Activity level: walking and stretching, yard work, 2-3x a week  Work: school bus substitute - summer off    Bowel habits: 2-3 BM/day. Endorses mild constipation around 1x a month, denies pain with defecation. Reports type 4 stool using the BSS.     GYN habits: Reports seeing GYN regularly. Patient is  with vaginal delivery, endorses perineal tearing . Endorses hemorrhoids internally, had fissure surgery in late , and endometriosis. Patient is taking estradiol every 2 weeks but forgets often. Endorses history of UTIs that do occur a few times a year. Is currently sexually active reports pain with penetrative sexual activity, for the last 10 years.     INTEGRATED BIOPHARMA system history: (hip, back, pelvic pain)  - History of surgery: : pt had tubes up her urethra to stretch it, hysterectomy  secondary to endometriosis, bladder suspension after, gallbladder removal     Objective       Abdominal Assessment:      Abdominal Assessment: Deferred due to time constraints      General Perineum Exam:   Positive for hemorrhoids.     Visual Inspection of Perineum:   Excursion of perineal body in cephalad direction with contraction of pelvic floor muscles (PFM): good  Excursion of perineal body in caudal direction with relaxation of pelvic floor muscles  (PFM): fair   Involuntary contraction with coughing: no  Involuntary relaxation with bearing down: yes  Sensation: intact    Pelvic Organ Prolapse   no pelvic organ prolapse  Perineal body inspection: within normal limits        Pelvic Floor Muscle Exam:     Muscle Contraction: well isolated   Breathing pattern with contraction: holding breath   Pelvic floor muscle relaxation is delayed.         PERFECT Score   Power right: 4/5   Power left: 4/5   Endurance (seconds to max): 4   Repetitions (before fatigue): 4    Perfect Score: Possible rectocele noted at the posterior vaginal wall- patient informed to follow up with GYN. Significant TTP periurethrally bilaterally, at superficial transverse perineal, and L obturator internis and R iliococcygeus.      Rectal Pelvic Floor Muscle Exam    External anal sphincter: hemorrhoids    pelvic floor exam consent given by patient    Pelvic exam completed: vaginally and rectally            Precautions: standard    Insurance:  AMA/CMS Eval/ Re-eval POC expires PFDI Auth #/ Referral # Total    Start date  Expiration date Extension  Visit limitation?  PT only or  PT+OT? Co-Insurance   CMS Eval 5/24 8/10 119.8                                                                       AUTH #:  Date               Authed: Used                Remaining                     5/24/24        VISIT 1-IE        Manuals         PFM exam performed        Abdominal mobilizations NV        Colonic massage NV?        Scar mobilization         Neuro Re-Ed         Neural reset         PFMC slow holds         360 breathing         DB         DB+PFMC  NV?       TA+PFMC         Bridges         Clamshells         Add squeeze         TA+march         Biofeedback                  Ther Ex         Sit to stand +PFMC         Lateral walks with band         Rows/Ext         SLR         Squats         Lunges         Walking         Paloff press         QPED- LE ext          - fire hydrant         - thread the needle   *       -cat/cow HEP                 Remi pose HEP        Happy baby HEP        Butterfly HEP        Deep squat         SKTC/DKTC NV *       LTR NV *       Feet on wall                  Ther Activity         Voiding diary provided        Knack         Fiber intake education         Bowel habits education         Bladder irritants education provided        Toileting posture NV *?       Urge deferral strategies reviewed        Dilator/wand education Briefly reviewed        Increasing water intake education         Mindfulness education

## 2024-06-03 RX ORDER — FAMCICLOVIR 500 MG/1
500 TABLET ORAL AS NEEDED
Refills: 0 | Status: CANCELLED | OUTPATIENT
Start: 2024-06-03

## 2024-06-05 DIAGNOSIS — B00.1 HERPES LABIALIS: Primary | ICD-10-CM

## 2024-06-05 RX ORDER — FAMCICLOVIR 500 MG/1
1500 TABLET ORAL DAILY
Qty: 3 TABLET | Refills: 1 | Status: SHIPPED | OUTPATIENT
Start: 2024-06-05 | End: 2024-06-06

## 2024-06-18 DIAGNOSIS — G43.009 MIGRAINE WITHOUT AURA AND WITHOUT STATUS MIGRAINOSUS, NOT INTRACTABLE: ICD-10-CM

## 2024-06-18 RX ORDER — RIZATRIPTAN BENZOATE 10 MG/1
TABLET ORAL
Qty: 9 TABLET | Refills: 2 | Status: SHIPPED | OUTPATIENT
Start: 2024-06-18

## 2024-09-04 ENCOUNTER — HOSPITAL ENCOUNTER (OUTPATIENT)
Dept: RADIOLOGY | Facility: HOSPITAL | Age: 64
Discharge: HOME | End: 2024-09-04
Payer: COMMERCIAL

## 2024-09-04 DIAGNOSIS — Z12.31 SCREENING MAMMOGRAM FOR BREAST CANCER: ICD-10-CM

## 2024-09-04 PROCEDURE — 77067 SCR MAMMO BI INCL CAD: CPT

## 2024-09-04 PROCEDURE — 77063 BREAST TOMOSYNTHESIS BI: CPT

## 2024-09-20 ENCOUNTER — OFFICE VISIT (OUTPATIENT)
Dept: OBGYN CLINIC | Facility: CLINIC | Age: 64
End: 2024-09-20
Payer: COMMERCIAL

## 2024-09-20 ENCOUNTER — APPOINTMENT (OUTPATIENT)
Dept: RADIOLOGY | Facility: CLINIC | Age: 64
End: 2024-09-20
Payer: COMMERCIAL

## 2024-09-20 VITALS
DIASTOLIC BLOOD PRESSURE: 72 MMHG | HEART RATE: 69 BPM | SYSTOLIC BLOOD PRESSURE: 112 MMHG | BODY MASS INDEX: 24.42 KG/M2 | WEIGHT: 155.6 LBS | HEIGHT: 67 IN

## 2024-09-20 DIAGNOSIS — M25.551 RIGHT HIP PAIN: ICD-10-CM

## 2024-09-20 DIAGNOSIS — M16.11 PRIMARY OSTEOARTHRITIS OF RIGHT HIP: Primary | ICD-10-CM

## 2024-09-20 PROCEDURE — 73502 X-RAY EXAM HIP UNI 2-3 VIEWS: CPT

## 2024-09-20 PROCEDURE — 99214 OFFICE O/P EST MOD 30 MIN: CPT | Performed by: ORTHOPAEDIC SURGERY

## 2024-09-20 RX ORDER — MELOXICAM 7.5 MG/1
7.5 TABLET ORAL DAILY
Qty: 28 TABLET | Refills: 0 | Status: SHIPPED | OUTPATIENT
Start: 2024-09-20 | End: 2024-10-18

## 2024-09-20 NOTE — PROGRESS NOTES
Assessment/Plan:  1. Primary osteoarthritis of right hip  meloxicam (Mobic) 7.5 mg tablet    Ambulatory Referral to Physical Therapy      2. Right hip pain  XR hip/pelv 2-3 vws right if performed          Scribe Attestation      I,:  Justin Macedo MD am acting as a scribe while in the presence of the attending physician.:       I,:  Jacques Lamb, DO personally performed the services described in this documentation    as scribed in my presence.:           Jayda is a 64-year-old female with chronic right hip pain secondary to osteoarthritis of the right hip.  She has not undergone formal physical therapy and a referral was placed for a stretching and strengthening program.  She was also placed an order for meloxicam 7.5 mg for pain relief.  If she does not improve with physical therapy she should follow-up for possible intra-articular corticosteroid injection with spine and pain management.  Otherwise she may follow-up as needed.    Subjective: chronic right hip pain    Patient ID: Jayda Clayton is a 64 y.o. female.    HPI  Jayda is a 64-year-old female with approximately 1-1/2-year history of right hip pain.  She denies any traumatic event.  She has been treating it with over-the-counter anti-inflammatories and a home exercise program.  She states that the pain is particularly bothersome when walking or rising from seated position.  Most of her pain is in the posterior and anterior portion of her groin.  Occasionally she does get radiating symptoms but these are very rare in the knee.  She does have a history of low back pain.  She has not undergone physical therapy or had an injection into her right hip.  She denies numbness and tingling but occasionally gets burning.      Review of Systems   Constitutional:  Positive for activity change. Negative for chills and fever.   HENT:  Negative for ear pain and sore throat.    Eyes:  Negative for pain and visual disturbance.   Respiratory:  Negative for cough  and shortness of breath.    Cardiovascular:  Negative for chest pain and palpitations.   Gastrointestinal:  Negative for abdominal pain and vomiting.   Genitourinary:  Negative for dysuria and hematuria.   Musculoskeletal:  Positive for arthralgias. Negative for back pain.   Skin:  Negative for color change and rash.   Neurological:  Negative for seizures and syncope.   All other systems reviewed and are negative.        Past Medical History:   Diagnosis Date    Migraines     Neck pain     Osteopenia     Seasonal allergic reaction        Past Surgical History:   Procedure Laterality Date    ANAL FISSURECTOMY      CHOLECYSTECTOMY      laparoscopy    EPIDURAL BLOCK INJECTION N/A 12/29/2016    Procedure: BLOCK / INJECTION EPIDURAL STEROID CERVICAL  C7-T1;  Surgeon: Familia Ferreira MD;  Location: [facility] MAIN OR;  Service:     EPIDURAL BLOCK INJECTION N/A 11/02/2016    Procedure: BLOCK / INJECTION EPIDURAL STEROID CERVICAL  C7-T1;  Surgeon: Familia Ferreira MD;  Location: [facility] MAIN OR;  Service:     FRACTURE SURGERY      right ankle    HYSTERECTOMY      OOPHORECTOMY Bilateral     GA EXCISION GANGLION WRIST DORSAL/VOLAR PRIMARY Right 12/29/2021    Procedure: EXCISION GANGLION CYST RIGHT HAND;  Surgeon: Randall Ch DO;  Location: [facility] MAIN OR;  Service: Orthopedics    GA HALLUX RIGIDUS W/CHEILECTOMY 1ST MP JT W/O IMPLT Left 4/14/2023    Procedure: CHEILECTOMY LEFT GREAT TOE JOINT;  Surgeon: Abhishek Silvestre DPM;  Location:  MAIN OR;  Service: Podiatry    GA TENDON SHEATH INCISION Right 12/29/2021    Procedure: RIGHT LONG FINGER A 1 PULLEY RELEASE,;  Surgeon: Randall Ch DO;  Location: [facility] MAIN OR;  Service: Orthopedics    ROTATOR CUFF REPAIR Right        Family History   Problem Relation Age of Onset    No Known Problems Mother     No Known Problems Father     No Known Problems Sister     Lung cancer Maternal Grandmother     No Known Problems Maternal Grandfather     No Known Problems Paternal Grandmother     Kidney  cancer Paternal Grandfather     No Known Problems Maternal Aunt     No Known Problems Maternal Aunt     No Known Problems Paternal Aunt     No Known Problems Paternal Aunt        Social History     Occupational History    Not on file   Tobacco Use    Smoking status: Former     Current packs/day: 0.00     Average packs/day: 0.3 packs/day for 40.0 years (10.0 ttl pk-yrs)     Types: Cigarettes     Start date:      Quit date: 2020     Years since quittin.6    Smokeless tobacco: Never   Vaping Use    Vaping status: Never Used   Substance and Sexual Activity    Alcohol use: Not Currently     Comment: social    Drug use: No    Sexual activity: Yes     Partners: Male         Current Outpatient Medications:     meloxicam (Mobic) 7.5 mg tablet, Take 1 tablet (7.5 mg total) by mouth daily for 28 days, Disp: 28 tablet, Rfl: 0    estradiol (ESTRACE VAGINAL) 0.1 mg/g vaginal cream, Insert 1 g into the vagina 3 (three) times a week, Disp: 42.5 g, Rfl: 0    famciclovir (FAMVIR) 500 mg tablet, Take 3 tablets (1,500 mg total) by mouth daily for 1 day, Disp: 3 tablet, Rfl: 1    rizatriptan (MAXALT) 10 mg tablet, 1 po q2hrs prn migraine, 30mg/max in 24 hrs, Disp: 9 tablet, Rfl: 2    solifenacin (VESICARE) 10 MG tablet, Take 1 tablet (10 mg total) by mouth daily, Disp: 30 tablet, Rfl: 5    Current Facility-Administered Medications:     bupivacaine (PF) (MARCAINE) 0.25 % injection 1 mL, 1 mL, Infiltration, , Abhishek Silvestre, DPM, 1 mL at 23 1600    triamcinolone acetonide (KENALOG-40) 40 mg/mL injection 20 mg, 20 mg, Infiltration, , Abhishek Acuña Silvestre, DPM, 20 mg at 23 1600    Allergies   Allergen Reactions    Other      Seasonal allergies    Percocet [Oxycodone-Acetaminophen] GI Intolerance    Celecoxib Palpitations       Objective:  Vitals:    24 1126   BP: 112/72   Pulse: 69       Body mass index is 24.37 kg/m².    Left Hip Exam     Tenderness   The patient is experiencing tenderness in the posterior and  anterior.    Range of Motion   Abduction:  50   Adduction:  30   Extension:  0   Flexion:  120   External rotation:  40   Internal rotation: 30     Muscle Strength   Abduction: 5/5   Adduction: 5/5   Flexion: 5/5     Tests   JOJO: positive  Aden: negative    Other   Erythema: absent  Sensation: normal  Pulse: present    Comments:  +FADIR  Calf compartments are soft and supple  (-) Modesta's sign  2+ DP and PT pulses with brisk capillary refill to the toes  Sural, saphenous, tibial, superficial, and deep peroneal motor and sensory distributions intact  Sensation light touch intact distally                Physical Exam  Vitals and nursing note reviewed.   Constitutional:       Appearance: Normal appearance.   HENT:      Head: Normocephalic.      Right Ear: External ear normal.      Left Ear: External ear normal.   Eyes:      Extraocular Movements: Extraocular movements intact.      Conjunctiva/sclera: Conjunctivae normal.   Cardiovascular:      Rate and Rhythm: Normal rate.      Pulses: Normal pulses.   Pulmonary:      Effort: Pulmonary effort is normal.      Breath sounds: Normal breath sounds.   Abdominal:      General: Abdomen is flat.   Musculoskeletal:         General: Normal range of motion.      Cervical back: Normal range of motion and neck supple.      Comments: See ortho exam   Skin:     General: Skin is warm and dry.   Neurological:      General: No focal deficit present.      Mental Status: alert.   Psychiatric:         Mood and Affect: Mood normal.         Behavior: Behavior normal.       I have personally reviewed pertinent films in PACS.      X-rays of the right hip demonstrate a moderate to severe osteoarthritis with joint space narrowing most pronounced over the inferomedial aspect of the hip with subchondral sclerosis of the inferomedial acetabulum and osteophyte formation.  No fracture or dislocation.

## 2024-10-01 ENCOUNTER — EVALUATION (OUTPATIENT)
Dept: PHYSICAL THERAPY | Facility: CLINIC | Age: 64
End: 2024-10-01
Payer: COMMERCIAL

## 2024-10-01 DIAGNOSIS — M16.11 PRIMARY OSTEOARTHRITIS OF RIGHT HIP: Primary | ICD-10-CM

## 2024-10-01 PROCEDURE — 97162 PT EVAL MOD COMPLEX 30 MIN: CPT | Performed by: PHYSICAL THERAPIST

## 2024-10-01 NOTE — PROGRESS NOTES
PT Evaluation     Today's date: 10/1/2024  Patient name: Jayda Clayton  : 1960  MRN: [id number]  Referring provider: Jacques Lamb DO  Dx:   Encounter Diagnosis     ICD-10-CM    1. Primary osteoarthritis of right hip  M16.11 Ambulatory Referral to Physical Therapy                     Assessment  Impairments: abnormal gait, abnormal or restricted ROM, activity intolerance, impaired physical strength, lacks appropriate home exercise program, pain with function, weight-bearing intolerance and poor body mechanics    Assessment details: Jayda Clayton is a 64 y.o. female who presents with pain, decreased strength, decreased ROM, decreased joint mobility, ambulatory dysfunction, and postural dysfunction. Due to these impairments, patient has difficulty performing ADL's, recreational activities, work-related activities, engaging in social activities, stair negotiation, lifting/carrying, transfers. Patient's clinical presentation is consistent with their referring diagnosis of Primary osteoarthritis of right hip  (primary encounter diagnosis)  Plan: Ambulatory Referral to Physical Therapy  . Patient has been educated in gait pattern, home exercise program and plan of care. Patient would benefit from skilled physical therapy services to address their aforementioned functional limitations and progress towards prior level of function and independence with home exercise program.       Goals  Short Term Goals to be accomplished in 3 weeks:  STG1: Pt will be I with HEP  STG 2: demonstrate knowledge of appropriate static/dynamic joint protection   STG3: Pt will demo 50% improvement in hip AROM  STG4: Pt will demo no gait deviations due to pain to improve ambulation in community     Long Term Goals to be accomplished in 6 weeks:   LTG1: Pt will achieve full hip AROM  LTG2: Pt will demo hip strength WNL as pee PLOF  LTG3: Pt will return to work/household duties without pain     Plan  Patient would benefit from: PT eval and  skilled physical therapy  Planned modality interventions: cryotherapy and thermotherapy: hydrocollator packs    Planned therapy interventions: manual therapy, neuromuscular re-education, self care, therapeutic activities, therapeutic exercise and home exercise program    Frequency: 2x week  Duration in weeks: 6  Plan of Care beginning date: 10/1/2024  Plan of Care expiration date: 2024  Treatment plan discussed with: patient  Plan details:  HEP development, stretching, strengthening, A/AA/PROM, joint mobilizations, posture education, STM/MI as needed to reduce muscle tension, muscle reeducation, patient has been educated in Dx, prognosis and plan of care and is in agreement.         Subjective Evaluation    History of Present Illness  Mechanism of injury: Pt reports that she has been having the hip pain for quite some time. 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   Patient Goals  Patient goals for therapy: increased strength, independence with ADLs/IADLs, return to sport/leisure activities, return to work, increased motion and decreased pain    Pain  Current pain ratin  At best pain ratin  At worst pain ratin  Quality: burning, dull ache, discomfort, cramping, pressure and tight  Relieving factors: relaxation, rest, ice, heat and medications    Social Support  Steps to enter house: yes  Stairs in house: no     Employment status: working  Hand dominance: right    Treatments  Previous treatment: physical therapy  Current treatment: physical therapy      Objective     Active Range of Motion   Left Hip   Normal active range of motion    Right Hip   Normal active range of motion    Strength/Myotome Testing     Left Hip   Normal muscle strength    Right Hip   Planes of Motion   Flexion: 4-  Extension: 4-  Abduction: 4-  External rotation: 4-    Isolated  Muscles   Gluteus maximums: 4-  Gluteus medius: 4-    Tests     Right Hip   Positive JOJO, Aden and scour.              POC expires Auth Status Total   Visits  Start date  Expiration date PT/OT + Visit Limit? Co-Insurance                                                      Visit/Unit Tracking  AUTH Status:  Date                 Authed:  Used                Remaining                     Precautions: standard  SOC: 10/01/24  POC: 11/11/24  HEP next session due to time restraint       Manuals                                                                 Neuro Re-Ed                                                                                                        Ther Ex                                                                                                                     Ther Activity                                       Gait Training                                       Modalities

## 2024-10-09 ENCOUNTER — OFFICE VISIT (OUTPATIENT)
Dept: PHYSICAL THERAPY | Facility: CLINIC | Age: 64
End: 2024-10-09
Payer: COMMERCIAL

## 2024-10-09 DIAGNOSIS — M16.11 PRIMARY OSTEOARTHRITIS OF RIGHT HIP: Primary | ICD-10-CM

## 2024-10-09 PROCEDURE — 97140 MANUAL THERAPY 1/> REGIONS: CPT | Performed by: PHYSICAL THERAPIST

## 2024-10-09 PROCEDURE — 97112 NEUROMUSCULAR REEDUCATION: CPT | Performed by: PHYSICAL THERAPIST

## 2024-10-09 NOTE — PROGRESS NOTES
Daily Note     Today's date: 10/9/2024  Patient name: Jayda Clayton  : 1960  MRN: [id number]  Referring provider: Jacques Lamb DO  Dx:   Encounter Diagnosis     ICD-10-CM    1. Primary osteoarthritis of right hip  M16.11                      Subjective: Pt reports that she is feeling some pain in her right hip and while she was doing some walking over the weekend she felt some pain in her left side.       Objective: See treatment diary below      Assessment: Tolerated treatment well. Patient demonstrated fatigue post treatment, exhibited good technique with therapeutic exercises, would benefit from continued PT, and responded well to therex with no adverse reactions. She needs cues in order to make sure she is engaging symptoms appropriately. Pt presents with core weakness and hip muscle weakness with the right being weaker than the left. Pt will continue to benefit from skilled physical therapy in order to maximize strength and improve quality of movement.        Plan: Continue per plan of care.      POC expires Auth Status Total   Visits  Start date  Expiration date PT/OT + Visit Limit? Co-Insurance                                                      Visit/Unit Tracking  AUTH Status:  Date                 Authed:  Used                Remaining                     Precautions: standard  SOC: 10/01/24  POC: 24  HEP Access Code: [email or web address]  URL: [email or web address]  Date: 10/09/2024  Prepared by: Jade Rothman    Exercises  - Sidelying Hip Abduction  - 1 x daily - 7 x weekly - 3 sets - 10 reps  - Prone Hip Extension  - 1 x daily - 7 x weekly - 3 sets - 10 reps  - Active Straight Leg Raise with Quad Set  - 1 x daily - 7 x weekly - 3 sets - 10 reps  - Marching Bridge  - 1 x daily - 7 x weekly - 3 sets - 10 reps    Manuals 10/09/24                          LAD right hip- IM              Infer glide right hip- IM                          Neuro Re-Ed              TA iso x 10               Bird-dog x 5 B cues req              Dead bug modified LE only x 10              Piriformis stretch 10s x 5              SLR 3 way x 15 ea.  B   Flexion   Extension   Abd              HS stretch seated x 5              Bridge x 10    Bridge + march x 5 B             Ther Ex                           RB active w/u x 10 min lvl 2 RPM 50                                                                                           Ther Activity                                       Gait Training                                       Modalities

## 2024-10-16 ENCOUNTER — OFFICE VISIT (OUTPATIENT)
Dept: PHYSICAL THERAPY | Facility: CLINIC | Age: 64
End: 2024-10-16
Payer: COMMERCIAL

## 2024-10-16 DIAGNOSIS — M16.11 PRIMARY OSTEOARTHRITIS OF RIGHT HIP: Primary | ICD-10-CM

## 2024-10-16 PROCEDURE — 97110 THERAPEUTIC EXERCISES: CPT | Performed by: PHYSICAL THERAPIST

## 2024-10-16 PROCEDURE — 97112 NEUROMUSCULAR REEDUCATION: CPT | Performed by: PHYSICAL THERAPIST

## 2024-10-16 NOTE — PROGRESS NOTES
Daily Note     Today's date: 10/16/2024  Patient name: Jayda Clayton  : 1960  MRN: [id number]  Referring provider: Jacques Lamb DO  Dx:   Encounter Diagnosis     ICD-10-CM    1. Primary osteoarthritis of right hip  M16.11                      Subjective: Pt reports that she has been doing HEP regularly and is having some discomfort in her left knee however, reports that she has been dealing with that for quite some time.      Objective: See treatment diary below      Assessment: Tolerated treatment well. Patient demonstrated fatigue post treatment, exhibited good technique with therapeutic exercises, and would benefit from continued PT      Plan: Continue per plan of care.      POC expires Auth Status Total   Visits  Start date  Expiration date PT/OT + Visit Limit? Co-Insurance                                                      Visit/Unit Tracking  AUTH Status:  Date                 Authed:  Used                Remaining                     Precautions: standard  SOC: 10/01/24  POC: 24  HEP Access Code: [email or web address]  URL: [email or web address]  Date: 10/09/2024  Prepared by: Jade Rothman    Exercises  - Sidelying Hip Abduction  - 1 x daily - 7 x weekly - 3 sets - 10 reps  - Prone Hip Extension  - 1 x daily - 7 x weekly - 3 sets - 10 reps  - Active Straight Leg Raise with Quad Set  - 1 x daily - 7 x weekly - 3 sets - 10 reps  - Marching Bridge  - 1 x daily - 7 x weekly - 3 sets - 10 reps    Manuals 10/09/24 10/16/24                         LAD right hip- IM              Infer glide right hip- IM                          Neuro Re-Ed              TA iso x 10  HEP            Bird-dog x 5 B cues req  Bridge x 20 5s old             Dead bug modified LE only x 10  Piriformis stretch 10s             Piriformis stretch 10s x 5  HS stretch SOS x 10             SLR 3 way x 15 ea.  B   Flexion   Extension   Abd  Bridge + marches x 15             HS stretch seated x 5  Clamshells x 20              Bridge x 10    Bridge + march x 5 B             Ther Ex                           RB active w/u x 10 min lvl 2 RPM 50  RB active w/u x 10 min lvl 3                                                                                          Ther Activity                                       Gait Training                                       Modalities

## 2024-10-23 ENCOUNTER — APPOINTMENT (OUTPATIENT)
Dept: PHYSICAL THERAPY | Facility: CLINIC | Age: 64
End: 2024-10-23
Payer: COMMERCIAL

## 2024-10-24 ENCOUNTER — OFFICE VISIT (OUTPATIENT)
Dept: PHYSICAL THERAPY | Facility: CLINIC | Age: 64
End: 2024-10-24
Payer: COMMERCIAL

## 2024-10-24 ENCOUNTER — APPOINTMENT (OUTPATIENT)
Dept: PHYSICAL THERAPY | Facility: CLINIC | Age: 64
End: 2024-10-24
Payer: COMMERCIAL

## 2024-10-24 DIAGNOSIS — M16.11 PRIMARY OSTEOARTHRITIS OF RIGHT HIP: Primary | ICD-10-CM

## 2024-10-24 PROCEDURE — 97112 NEUROMUSCULAR REEDUCATION: CPT | Performed by: PHYSICAL THERAPIST

## 2024-10-24 NOTE — PROGRESS NOTES
Daily Note     Today's date: 10/24/2024  Patient name: Jayda Clayton  : 1960  MRN: [id number]  Referring provider: Jacques Lamb DO  Dx:   Encounter Diagnosis     ICD-10-CM    1. Primary osteoarthritis of right hip  M16.11                      Subjective: Pt reports that she does feel sore today due to excessive walking over the last few days. States that pain level is 4/10.       Objective: See treatment diary below      Assessment: Tolerated treatment well. Patient demonstrated fatigue post treatment, exhibited good technique with therapeutic exercises, and would benefit from continued PT  Pt responded well to progression of therex with no adverse reactions. She does require additional strengthening of hip muscles and core strength. Pt will continue to benefit from skilled physical therapy in order to maximize strength and improve quality of movement.     Plan: Continue per plan of care.      POC expires Auth Status Total   Visits  Start date  Expiration date PT/OT + Visit Limit? Co-Insurance                                                      Visit/Unit Tracking  AUTH Status:  Date                 Authed:  Used                Remaining                     Precautions: standard  SOC: 10/01/24  POC: 24  HEP Access Code: [email or web address]  URL: [email or web address]  Date: 10/09/2024  Prepared by: Jade Rothman    Exercises  - Sidelying Hip Abduction  - 1 x daily - 7 x weekly - 3 sets - 10 reps  - Prone Hip Extension  - 1 x daily - 7 x weekly - 3 sets - 10 reps  - Active Straight Leg Raise with Quad Set  - 1 x daily - 7 x weekly - 3 sets - 10 reps  - Marching Bridge  - 1 x daily - 7 x weekly - 3 sets - 10 reps    Manuals 10/09/24 10/16/24 10/24                        LAD right hip- IM   LAD right hip- IM            Infer glide right hip- IM   Post and inf glide grade 3                       Neuro Re-Ed              TA iso x 10  HEP Hip burner x 15 5s hold            Bird-dog x 5 B cues req   Bridge x 20 5s old  Pallof press x 20 B 9.5#            Dead bug modified LE only x 10  Piriformis stretch 10s  STS w/ 9kg x 20 from chair            Piriformis stretch 10s x 5  HS stretch SOS x 10  Runners step up x 20 B            SLR 3 way x 15 ea.  B   Flexion   Extension   Abd  Bridge + marches x 15  Standing hip abduction and ext x 20 B            HS stretch seated x 5  Clamshells x 20             Bridge x 10    Bridge + march x 5 B             Ther Ex                           RB active w/u x 10 min lvl 2 RPM 50  RB active w/u x 10 min lvl 3  RB active x 10 min lvl 3                                                                                         Ther Activity                                       Gait Training                                       Modalities

## 2024-10-26 DIAGNOSIS — B00.1 HERPES LABIALIS: ICD-10-CM

## 2024-10-26 RX ORDER — FAMCICLOVIR 500 MG/1
TABLET ORAL
Qty: 3 TABLET | Refills: 1 | Status: SHIPPED | OUTPATIENT
Start: 2024-10-26 | End: 2024-10-27

## 2024-10-27 PROBLEM — M79.675 GREAT TOE PAIN, LEFT: Status: RESOLVED | Noted: 2023-04-06 | Resolved: 2024-10-27

## 2024-10-27 PROBLEM — R53.1 EPISODE OF GENERALIZED WEAKNESS: Status: RESOLVED | Noted: 2023-01-12 | Resolved: 2024-10-27

## 2024-10-27 PROBLEM — Z01.818 PREOPERATIVE CLEARANCE: Status: RESOLVED | Noted: 2023-04-02 | Resolved: 2024-10-27

## 2024-10-27 PROBLEM — R39.15 URGENCY OF URINATION: Status: RESOLVED | Noted: 2024-05-09 | Resolved: 2024-10-27

## 2024-10-30 ENCOUNTER — OFFICE VISIT (OUTPATIENT)
Dept: PHYSICAL THERAPY | Facility: CLINIC | Age: 64
End: 2024-10-30
Payer: COMMERCIAL

## 2024-10-30 DIAGNOSIS — M16.11 PRIMARY OSTEOARTHRITIS OF RIGHT HIP: Primary | ICD-10-CM

## 2024-10-30 PROCEDURE — 97112 NEUROMUSCULAR REEDUCATION: CPT | Performed by: PHYSICAL THERAPIST

## 2024-10-30 NOTE — PROGRESS NOTES
Daily Note     Today's date: 10/30/2024  Patient name: Jayda Clayton  : 1960  MRN: [id number]  Referring provider: Jacques Lamb DO  Dx:   Encounter Diagnosis     ICD-10-CM    1. Primary osteoarthritis of right hip  M16.11                      Subjective:  Pt reports that she has noted improvements in symptoms. She feels things are getting better and is pleased with overall progress.       Objective: See treatment diary below      Assessment: Tolerated treatment well. Patient demonstrated fatigue post treatment, exhibited good technique with therapeutic exercises, and would benefit from continued PT Demos improvements in strength, gait pattern, and ROM.     Plan: Continue per plan of care.      POC expires Auth Status Total   Visits  Start date  Expiration date PT/OT + Visit Limit? Co-Insurance                                                      Visit/Unit Tracking  AUTH Status:  Date                 Authed:  Used                Remaining                     Precautions: standard  SOC: 10/01/24  POC: 24  HEP Access Code: [email or web address]  URL: [email or web address]  Date: 10/09/2024  Prepared by: Jade Rothman    Exercises  - Sidelying Hip Abduction  - 1 x daily - 7 x weekly - 3 sets - 10 reps  - Prone Hip Extension  - 1 x daily - 7 x weekly - 3 sets - 10 reps  - Active Straight Leg Raise with Quad Set  - 1 x daily - 7 x weekly - 3 sets - 10 reps  - Marching Bridge  - 1 x daily - 7 x weekly - 3 sets - 10 reps    Manuals 10/09/24 10/16/24 10/24 10/30/24                       LAD right hip- IM   LAD right hip- IM  Manual hip  flexor stretch - IM           Infer glide right hip- IM   Post and inf glide grade 3 Post and inf glide grade 3 - IM                       Neuro Re-Ed              TA iso x 10  HEP Hip burner x 15 5s hold  Hip burner x 20 5s hold           Bird-dog x 5 B cues req  Bridge x 20 5s old  Pallof press x 20 B 9.5#  Pallof press x 20 9.5#          Dead bug modified LE only x 10   Piriformis stretch 10s  STS w/ 9kg x 20 from chair  Piriforms stretch 10s x 5 B           Piriformis stretch 10s x 5  HS stretch SOS x 10  Runners step up x 20 B  Sidestepping x 5 laps 15 ft           SLR 3 way x 15 ea.  B   Flexion   Extension   Abd  Bridge + marches x 15  Standing hip abduction and ext x 20 B            HS stretch seated x 5  Clamshells x 20   Advance clamshells x 20 B           Bridge x 10    Bridge + march x 5 B    Bridge x 15     Bridge + marches x 15          Ther Ex                           RB active w/u x 10 min lvl 2 RPM 50  RB active w/u x 10 min lvl 3  RB active x 10 min lvl 3                                                                                         Ther Activity                                       Gait Training                                       Modalities

## 2024-10-31 ENCOUNTER — OFFICE VISIT (OUTPATIENT)
Dept: FAMILY MEDICINE CLINIC | Facility: CLINIC | Age: 64
End: 2024-10-31
Payer: COMMERCIAL

## 2024-10-31 VITALS
BODY MASS INDEX: 24.75 KG/M2 | SYSTOLIC BLOOD PRESSURE: 108 MMHG | DIASTOLIC BLOOD PRESSURE: 62 MMHG | OXYGEN SATURATION: 99 % | RESPIRATION RATE: 18 BRPM | TEMPERATURE: 97.6 F | HEIGHT: 66 IN | WEIGHT: 154 LBS | HEART RATE: 79 BPM

## 2024-10-31 DIAGNOSIS — Z90.710 S/P HYSTERECTOMY WITH OOPHORECTOMY: ICD-10-CM

## 2024-10-31 DIAGNOSIS — G43.009 MIGRAINE WITHOUT AURA AND WITHOUT STATUS MIGRAINOSUS, NOT INTRACTABLE: ICD-10-CM

## 2024-10-31 DIAGNOSIS — B00.1 HERPES LABIALIS: ICD-10-CM

## 2024-10-31 DIAGNOSIS — Z23 IMMUNIZATION DUE: ICD-10-CM

## 2024-10-31 DIAGNOSIS — Z13.220 SCREENING FOR LIPID DISORDERS: ICD-10-CM

## 2024-10-31 DIAGNOSIS — Z00.00 HEALTHCARE MAINTENANCE: Primary | ICD-10-CM

## 2024-10-31 DIAGNOSIS — Z90.721 S/P HYSTERECTOMY WITH OOPHORECTOMY: ICD-10-CM

## 2024-10-31 DIAGNOSIS — Z13.1 SCREENING FOR DIABETES MELLITUS (DM): ICD-10-CM

## 2024-10-31 PROCEDURE — 90715 TDAP VACCINE 7 YRS/> IM: CPT

## 2024-10-31 PROCEDURE — 90471 IMMUNIZATION ADMIN: CPT

## 2024-10-31 PROCEDURE — 99396 PREV VISIT EST AGE 40-64: CPT | Performed by: FAMILY MEDICINE

## 2024-10-31 RX ORDER — FAMCICLOVIR 500 MG/1
TABLET ORAL
Qty: 15 TABLET | Refills: 1 | Status: SHIPPED | OUTPATIENT
Start: 2024-10-31 | End: 2025-10-01

## 2024-10-31 RX ORDER — RIZATRIPTAN BENZOATE 10 MG/1
TABLET ORAL
Qty: 9 TABLET | Refills: 5 | Status: SHIPPED | OUTPATIENT
Start: 2024-10-31

## 2024-10-31 NOTE — PROGRESS NOTES
Assessment/Plan:    No problem-specific Assessment & Plan notes found for this encounter.    Cpe  Update labs  Headaches stable with maxalt, if gets more frequently then f/u for prophylaxis options  Famvir dosing reviewed for HSV1 labialis     Diagnoses and all orders for this visit:    Healthcare maintenance    Immunization due  -     TDAP VACCINE GREATER THAN OR EQUAL TO 6YO IM    Screening for diabetes mellitus (DM)  -     Comprehensive metabolic panel; Future    Screening for lipid disorders  -     Lipid Panel with Direct LDL reflex; Future    Migraine without aura and without status migrainosus, not intractable  -     rizatriptan (MAXALT) 10 mg tablet; 1 po q2hrs prn migraine, 30mg/max in 24 hrs    Herpes labialis  -     famciclovir (FAMVIR) 500 mg tablet; 1500mg at once at earliest onset of cold sore        Return in about 6 months (around 4/30/2025) for Recheck.    Subjective:      Patient ID: Jayda Clayton is a 64 y.o. female.    Chief Complaint   Patient presents with    Physical Exam     rmklpn       HPI  Still driving bus for school  Daily exercise  Wts and cardio  30minutes per session  Drinks mostly water  Healthy diet  Likes cheese    Maxalt for headaches  1-2x headaches per month  Maxalt helps  Stress triggers mostly    Urine/stool w/o blood    The following portions of the patient's history were reviewed and updated as appropriate: allergies, current medications, past family history, past medical history, past social history, past surgical history and problem list.    Review of Systems   Neurological:  Positive for headaches. Negative for dizziness and syncope.         Current Outpatient Medications   Medication Sig Dispense Refill    estradiol (ESTRACE VAGINAL) 0.1 mg/g vaginal cream Insert 1 g into the vagina 3 (three) times a week 42.5 g 0    famciclovir (FAMVIR) 500 mg tablet 1500mg at once at earliest onset of cold sore 15 tablet 1    meloxicam (Mobic) 7.5 mg tablet Take 1 tablet (7.5 mg total)  "by mouth daily for 28 days 28 tablet 0    rizatriptan (MAXALT) 10 mg tablet 1 po q2hrs prn migraine, 30mg/max in 24 hrs 9 tablet 5    solifenacin (VESICARE) 10 MG tablet Take 1 tablet (10 mg total) by mouth daily 30 tablet 5     Current Facility-Administered Medications   Medication Dose Route Frequency Provider Last Rate Last Admin    bupivacaine (PF) (MARCAINE) 0.25 % injection 1 mL  1 mL Infiltration  Abhishek Acuña Silvestre, DPM   1 mL at 12/13/23 1600    triamcinolone acetonide (KENALOG-40) 40 mg/mL injection 20 mg  20 mg Infiltration  Abhishek Acuña Silvestre, DPM   20 mg at 12/13/23 1600       Objective:    /62   Pulse 79   Temp 97.6 °F (36.4 °C)   Resp 18   Ht 5' 6\" (1.676 m)   Wt 69.9 kg (154 lb)   SpO2 99%   BMI 24.86 kg/m²        Physical Exam  Vitals and nursing note reviewed.   Constitutional:       General: She is not in acute distress.     Appearance: She is well-developed. She is not ill-appearing.   HENT:      Head: Normocephalic.      Right Ear: Tympanic membrane normal.      Left Ear: Tympanic membrane normal.      Mouth/Throat:      Mouth: Mucous membranes are moist.   Eyes:      General: No scleral icterus.     Conjunctiva/sclera: Conjunctivae normal.   Neck:      Vascular: No carotid bruit.   Cardiovascular:      Rate and Rhythm: Normal rate and regular rhythm.   Pulmonary:      Effort: Pulmonary effort is normal. No respiratory distress.      Breath sounds: No wheezing.   Abdominal:      General: There is no distension.      Palpations: Abdomen is soft.      Tenderness: There is no abdominal tenderness.   Musculoskeletal:         General: No deformity.      Cervical back: Neck supple.      Right lower leg: No edema.      Left lower leg: No edema.   Skin:     General: Skin is warm and dry.      Coloration: Skin is not pale.   Neurological:      Mental Status: She is alert.      Motor: No weakness.      Gait: Gait normal.   Psychiatric:         Mood and Affect: Mood normal.         Behavior: " Behavior normal.         Thought Content: Thought content normal.                Nikita Aponte, DO

## 2024-11-06 ENCOUNTER — EVALUATION (OUTPATIENT)
Dept: PHYSICAL THERAPY | Facility: CLINIC | Age: 64
End: 2024-11-06
Payer: COMMERCIAL

## 2024-11-06 DIAGNOSIS — M16.11 PRIMARY OSTEOARTHRITIS OF RIGHT HIP: Primary | ICD-10-CM

## 2024-11-06 PROCEDURE — 97112 NEUROMUSCULAR REEDUCATION: CPT | Performed by: PHYSICAL THERAPIST

## 2024-11-06 PROCEDURE — 97164 PT RE-EVAL EST PLAN CARE: CPT | Performed by: PHYSICAL THERAPIST

## 2024-11-06 NOTE — PROGRESS NOTES
PT Evaluation     Today's date: 2024  Patient name: Jayda Clayton  : 1960  MRN: [id number]  Referring provider: Jacques Lamb DO  Dx:   Encounter Diagnosis     ICD-10-CM    1. Primary osteoarthritis of right hip  M16.11                      Assessment  Impairments: abnormal gait, abnormal or restricted ROM, activity intolerance, impaired physical strength, lacks appropriate home exercise program, pain with function, weight-bearing intolerance and poor body mechanics    Assessment details: Update 24: Pt reports she feels better and has noted improvements in symptoms. She states that she no longer has pain as before and feels about 80% better since starting therapy. Pt has demonstrated improvements in core strength, hip muscle strength, and improved gait pattern. She will be officially discharged to an [facility] at this time for independent management.     Jayda Clayton is a 64 y.o. female who presents with pain, decreased strength, decreased ROM, decreased joint mobility, ambulatory dysfunction, and postural dysfunction. Due to these impairments, patient has difficulty performing ADL's, recreational activities, work-related activities, engaging in social activities, stair negotiation, lifting/carrying, transfers. Patient's clinical presentation is consistent with their referring diagnosis of Primary osteoarthritis of right hip  (primary encounter diagnosis)  Plan: Ambulatory Referral to Physical Therapy  . Patient has been educated in gait pattern, home exercise program and plan of care. Patient would benefit from skilled physical therapy services to address their aforementioned functional limitations and progress towards prior level of function and independence with home exercise program.       Goals  Short Term Goals to be accomplished in 3 weeks:  STG1: Pt will be I with HEP met   STG 2: demonstrate knowledge of appropriate static/dynamic joint protection met  STG3: Pt will demo 50% improvement in  hip AROM met   STG4: Pt will demo no gait deviations due to pain to improve ambulation in community met      Long Term Goals to be accomplished in 6 weeks:   LTG1: Pt will achieve full hip AROM met   LTG2: Pt will demo hip strength WNL as pee PLOF met   LTG3: Pt will return to work/household duties without pain met     Plan  Patient would benefit from: PT eval and skilled physical therapy  Planned modality interventions: cryotherapy and thermotherapy: hydrocollator packs    Planned therapy interventions: manual therapy, neuromuscular re-education, self care, therapeutic activities, therapeutic exercise and home exercise program    Frequency: 2x week  Duration in weeks: 6  Plan of Care beginning date: 10/1/2024  Plan of Care expiration date: 2024  Treatment plan discussed with: patient  Plan details:  HEP development, stretching, strengthening, A/AA/PROM, joint mobilizations, posture education, STM/MI as needed to reduce muscle tension, muscle reeducation, patient has been educated in Dx, prognosis and plan of care and is in agreement.       Subjective Evaluation    History of Present Illness  Mechanism of injury: Pt reports that she has been having the hip pain for quite some time. 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   Patient Goals  Patient goals for therapy: increased strength, independence with ADLs/IADLs, return to sport/leisure activities, return to work, increased motion and decreased pain    Pain  Current pain ratin  At best pain ratin  At worst pain ratin  Quality: burning, dull ache, discomfort, cramping, pressure and tight  Relieving factors: relaxation, rest, ice, heat and medications    Social Support  Steps to enter house: yes  Stairs in house: no     Employment status: working  Hand dominance: right    Treatments  Previous  treatment: physical therapy  Current treatment: physical therapy    Objective     Active Range of Motion   Left Hip   Normal active range of motion    Right Hip   Normal active range of motion    Strength/Myotome Testing     Left Hip   Normal muscle strength    Right Hip   Planes of Motion   Flexion: 4+  Extension: 4+  Abduction: 4+  External rotation: 4+    Isolated Muscles   Gluteus maximums: 4+  Gluteus medius: 4+    Tests     Right Hip   Negative JOJO, Aden and scour.              POC expires Auth Status Total   Visits  Start date  Expiration date PT/OT + Visit Limit? Co-Insurance                                                  Visit/Unit Tracking  AUTH Status:  Date                 Authed:  Used                Remaining                     Precautions: standard  SOC: 10/01/24  POC: 11/11/24  HEP Access Code: [email or web address]  URL: [email or web address]  Date: 10/09/2024  Prepared by: Jade Rothman    Exercises  - Sidelying Hip Abduction  - 1 x daily - 7 x weekly - 3 sets - 10 reps  - Prone Hip Extension  - 1 x daily - 7 x weekly - 3 sets - 10 reps  - Active Straight Leg Raise with Quad Set  - 1 x daily - 7 x weekly - 3 sets - 10 reps  - Marching Bridge  - 1 x daily - 7 x weekly - 3 sets - 10 reps    Manuals 10/09/24 10/16/24 10/24 10/30/24 11/06/24                      LAD right hip- IM   LAD right hip- IM  Manual hip  flexor stretch - IM           Infer glide right hip- IM   Post and inf glide grade 3 Post and inf glide grade 3 - IM                       Neuro Re-Ed              TA iso x 10  HEP Hip burner x 15 5s hold  Hip burner x 20 5s hold  Hip burner x 20 5s hold          Bird-dog x 5 B cues req  Bridge x 20 5s old  Pallof press x 20 B 9.5#  Pallof press x 20 9.5# Pallof press 11.5 x 20 B          Dead bug modified LE only x 10  Piriformis stretch 10s  STS w/ 9kg x 20 from chair  Piriforms stretch 10s x 5 B  Sidestepping black x 5 laps 10 ft          Piriformis stretch 10s x 5  HS stretch SOS x 10   Runners step up x 20 B  Sidestepping x 5 laps 15 ft  Step up 10' x 20 B          SLR 3 way x 15 ea.  B   Flexion   Extension   Abd  Bridge + marches x 15  Standing hip abduction and ext x 20 B   STS from 12' box x 20          HS stretch seated x 5  Clamshells x 20   Advance clamshells x 20 B  Advance clamshells          Bridge x 10    Bridge + march x 5 B    Bridge x 15     Bridge + marches x 15  Bridge  x 15         Ther Ex                           RB active w/u x 10 min lvl 2 RPM 50  RB active w/u x 10 min lvl 3  RB active x 10 min lvl 3   RB active x 10 min lvl 3                                                                                       Ther Activity                                       Gait Training                                       Modalities

## 2024-11-20 ENCOUNTER — APPOINTMENT (OUTPATIENT)
Dept: LAB | Facility: HOSPITAL | Age: 64
End: 2024-11-20
Payer: COMMERCIAL

## 2024-11-20 ENCOUNTER — NURSE TRIAGE (OUTPATIENT)
Age: 64
End: 2024-11-20

## 2024-11-20 DIAGNOSIS — R39.9 UTI SYMPTOMS: Primary | ICD-10-CM

## 2024-11-20 DIAGNOSIS — R31.29 MICROSCOPIC HEMATURIA: ICD-10-CM

## 2024-11-20 LAB
BACTERIA UR QL AUTO: ABNORMAL /HPF
BILIRUB UR QL STRIP: NEGATIVE
CLARITY UR: CLEAR
COLOR UR: ABNORMAL
GLUCOSE UR STRIP-MCNC: NEGATIVE MG/DL
HGB UR QL STRIP.AUTO: ABNORMAL
KETONES UR STRIP-MCNC: NEGATIVE MG/DL
LEUKOCYTE ESTERASE UR QL STRIP: NEGATIVE
NITRITE UR QL STRIP: NEGATIVE
NON-SQ EPI CELLS URNS QL MICRO: ABNORMAL /HPF
PH UR STRIP.AUTO: 6.5 [PH]
PROT UR STRIP-MCNC: NEGATIVE MG/DL
RBC #/AREA URNS AUTO: ABNORMAL /HPF
SP GR UR STRIP.AUTO: 1.02 (ref 1–1.03)
UROBILINOGEN UR STRIP-ACNC: <2 MG/DL
WBC #/AREA URNS AUTO: ABNORMAL /HPF

## 2024-11-20 PROCEDURE — 81001 URINALYSIS AUTO W/SCOPE: CPT

## 2024-11-20 NOTE — TELEPHONE ENCOUNTER
"Patient of Dr. Barclay, last seen 5/7/2024, wrote in via [employer] stating she is experiencing frequency, urgency, incomplete bladder emptying sensation, cloudy urine, foul smelling odor, and bladder pressure. She stated her symptoms about a week ago. She rates her pain a 7/10. She is also experiencing back and pelvic. Denies fever, flank pain, or blood in urine. I ordered UA and culture. I reviewed ED precautions, encouraged increasing water intake, and avoiding bladder irritants. I verified "[employer] Please advise.    Reason for Disposition   All other urine symptoms    Answer Assessment - Initial Assessment Questions  1. SYMPTOM: \"What's the main symptom you're concerned about?\" (e.g., frequency, incontinence)      Frequency, urgency, incomplete bladder emptying sensation, cloudy urine, foul smelling odor, bladder pressure    2. ONSET: \"When did the symptoms start?\"      Last week    3. PAIN: \"Is there any pain?\" If Yes, ask: \"How bad is it?\" (Scale: 1-10; mild, moderate, severe)      7/10    4. CAUSE: \"What do you think is causing the symptoms?\"      UTI    5. OTHER SYMPTOMS: \"Do you have any other symptoms?\" (e.g., blood in urine, fever, flank pain, pain with urination)      Back pain, pelvic pressure    Protocols used: Urinary Symptoms-Adult-OH    "

## 2024-11-21 LAB — BACTERIA UR CULT: NORMAL

## 2024-11-22 NOTE — TELEPHONE ENCOUNTER
Called and spoke to Jayda. She will schedule the CT, pt given the number to central scheduling.     Will arrange f/u appt when CT is scheduled

## 2024-11-29 ENCOUNTER — HOSPITAL ENCOUNTER (OUTPATIENT)
Dept: RADIOLOGY | Facility: HOSPITAL | Age: 64
Discharge: HOME | End: 2024-11-29
Payer: COMMERCIAL

## 2024-11-29 DIAGNOSIS — R31.29 MICROSCOPIC HEMATURIA: ICD-10-CM

## 2024-11-29 PROCEDURE — 74178 CT ABD&PLV WO CNTR FLWD CNTR: CPT

## 2024-11-29 RX ADMIN — IOHEXOL 100 ML: 350 INJECTION, SOLUTION INTRAVENOUS at 08:20

## 2024-11-30 PROBLEM — Z00.00 HEALTHCARE MAINTENANCE: Status: RESOLVED | Noted: 2024-10-31 | Resolved: 2024-11-30

## 2024-12-09 ENCOUNTER — OFFICE VISIT (OUTPATIENT)
Dept: UROLOGY | Facility: CLINIC | Age: 64
End: 2024-12-09
Payer: COMMERCIAL

## 2024-12-09 VITALS
HEIGHT: 66 IN | BODY MASS INDEX: 24.86 KG/M2 | OXYGEN SATURATION: 99 % | DIASTOLIC BLOOD PRESSURE: 70 MMHG | SYSTOLIC BLOOD PRESSURE: 120 MMHG | HEART RATE: 61 BPM

## 2024-12-09 DIAGNOSIS — R39.9 UTI SYMPTOMS: Primary | ICD-10-CM

## 2024-12-09 PROCEDURE — 99213 OFFICE O/P EST LOW 20 MIN: CPT

## 2024-12-09 NOTE — Clinical Note
Ciro Aponte,   Wanted to make you aware of the incidental finding on the CT urogram that demonstrated a lipomatous lesion within the bowel.  Not sure if this is something that patient will need to see GI for or not  Thank you  Dakota Hartman PA-C

## 2024-12-09 NOTE — PROGRESS NOTES
Office Visit- Urology  Jayda Clayton 1960 MRN: [id number]      Assessment/Discussion/Plan    64 y.o. female managed by     Microscopic hematuria  - status post negative cystoscopy in May 2024  -CT urogram with no significant urologic finding  -Continue to monitor.  Can consider repeat workup in 3 to 5 years.  Still persistent microscopic hematuria    2.  Lower urinary tract symptoms  -Previously trialed Vesicare but did not tolerate dry mouth as consequence of use  -Offered alternative pharmacotherapy.  Patient defers at this point in time  -Patient concerned about recurrent urinary tract infection.  Reviewed that her recent urine cultures did not demonstrate bacteriuria.  Reviewed estrogen replacement therapy for genitourinary syndrome menopause.  Already had pelvic floor evaluation by pelvic floor physical therapy   -standing order for urine culture/microanalysis placed  -Advised on bladder irritants, use of cranberry supplementation 37 g PA-C, d-mannose  -Follow-up in 6 months    3.  Lipomatous lesion in the pelvic small bowel  -Incidental finding on CT renal protocol for 11/29/2024  -Advised patient to follow-up with her PCP in regards to this finding.  Note sent to PCP       Chief Complaint:   Jayda is a 64 y.o. female presenting to the office for a follow up visit regarding microscopic hematuria        Subjective    Patient is a 64-year-old female with a history of persistent microscopic hematuria, atrophic vaginitis, low urinary tract symptoms, and concern for recurrent urine tract infection who presents to the office today for evaluation she was last in the office for cystoscopy by Dr. Barclay May 2024 that returned negative.  There is question of possible interstitial cystitis.  Patient was seen by pelvic  at the end of May but did not pursue intervention follow-up.  She was initiated on Vesicare but was not able to tolerate this due to dry mouth.  She reached outDue to concern for UTI  and had urine testing at the end of November which returned with persistent microscopic hematuria.  As patient last had CT urogram in 2020 a repeat order was placed. She returns to the office today to discuss the results of her CT urogram          ROS:   Review of Systems   Constitutional: Negative.  Negative for chills, fatigue and fever.   HENT: Negative.     Respiratory:  Negative for shortness of breath.    Cardiovascular:  Negative for chest pain.   Gastrointestinal: Negative.  Negative for abdominal pain.   Endocrine: Negative.    Musculoskeletal: Negative.    Skin: Negative.    Neurological: Negative.  Negative for dizziness and light-headedness.   Hematological: Negative.    Psychiatric/Behavioral: Negative.           Past Medical History  Past Medical History:   Diagnosis Date    Migraines     Neck pain     Osteopenia     Seasonal allergic reaction        Past Surgical History  Past Surgical History:   Procedure Laterality Date    ANAL FISSURECTOMY      CHOLECYSTECTOMY      laparoscopy    EPIDURAL BLOCK INJECTION N/A 12/29/2016    Procedure: BLOCK / INJECTION EPIDURAL STEROID CERVICAL  C7-T1;  Surgeon: Familia Ferreira MD;  Location: [facility] MAIN OR;  Service:     EPIDURAL BLOCK INJECTION N/A 11/02/2016    Procedure: BLOCK / INJECTION EPIDURAL STEROID CERVICAL  C7-T1;  Surgeon: Familia Ferreira MD;  Location: [facility] MAIN OR;  Service:     FRACTURE SURGERY      right ankle    HYSTERECTOMY      OOPHORECTOMY Bilateral     NH EXCISION GANGLION WRIST DORSAL/VOLAR PRIMARY Right 12/29/2021    Procedure: EXCISION GANGLION CYST RIGHT HAND;  Surgeon: Randall Ch DO;  Location: [facility] MAIN OR;  Service: Orthopedics    NH HALLUX RIGIDUS W/CHEILECTOMY 1ST MP JT W/O IMPLT Left 4/14/2023    Procedure: CHEILECTOMY LEFT GREAT TOE JOINT;  Surgeon: Abhishek Silvestre DPM;  Location:  MAIN OR;  Service: Podiatry    NH TENDON SHEATH INCISION Right 12/29/2021    Procedure: RIGHT LONG FINGER A 1 PULLEY RELEASE,;  Surgeon: Randall  DO Jing;  Location: [facility] MAIN OR;  Service: Orthopedics    ROTATOR CUFF REPAIR Right        Past Family History  Family History   Problem Relation Age of Onset    No Known Problems Mother     No Known Problems Father     No Known Problems Sister     Lung cancer Maternal Grandmother     No Known Problems Maternal Grandfather     No Known Problems Paternal Grandmother     Kidney cancer Paternal Grandfather     No Known Problems Maternal Aunt     No Known Problems Maternal Aunt     No Known Problems Paternal Aunt     No Known Problems Paternal Aunt        Past Social history  Social History     Socioeconomic History    Marital status: /Civil Union     Spouse name: Not on file    Number of children: Not on file    Years of education: Not on file    Highest education level: Not on file   Occupational History    Not on file   Tobacco Use    Smoking status: Former     Current packs/day: 0.00     Average packs/day: 0.3 packs/day for 40.0 years (10.0 ttl pk-yrs)     Types: Cigarettes     Start date:      Quit date: 2020     Years since quittin.9     Passive exposure: Past    Smokeless tobacco: Never   Vaping Use    Vaping status: Never Used   Substance and Sexual Activity    Alcohol use: Not Currently     Comment: social    Drug use: No    Sexual activity: Yes     Partners: Male   Other Topics Concern    Not on file   Social History Narrative    Not on file     Social Drivers of Health     Financial Resource Strain: Not on file   Food Insecurity: Not on file   Transportation Needs: Not on file   Physical Activity: Not on file   Stress: Not on file   Social Connections: Not on file   Intimate Partner Violence: Not on file   Housing Stability: Not on file       Current Medications  Current Outpatient Medications   Medication Sig Dispense Refill    estradiol (ESTRACE VAGINAL) 0.1 mg/g vaginal cream Insert 1 g into the vagina 3 (three) times a week 42.5 g 0    famciclovir (FAMVIR) 500 mg tablet 1500mg at  "once at earliest onset of cold sore 15 tablet 1    rizatriptan (MAXALT) 10 mg tablet 1 po q2hrs prn migraine, 30mg/max in 24 hrs 9 tablet 5    meloxicam (Mobic) 7.5 mg tablet Take 1 tablet (7.5 mg total) by mouth daily for 28 days 28 tablet 0    solifenacin (VESICARE) 10 MG tablet Take 1 tablet (10 mg total) by mouth daily 30 tablet 5     Current Facility-Administered Medications   Medication Dose Route Frequency Provider Last Rate Last Admin    bupivacaine (PF) (MARCAINE) 0.25 % injection 1 mL  1 mL Infiltration  Abhihsek Seeway Silvestre, DPM   1 mL at 12/13/23 1600    triamcinolone acetonide (KENALOG-40) 40 mg/mL injection 20 mg  20 mg Infiltration  Abhishek Seeway Silvestre, DPM   20 mg at 12/13/23 1600       Allergies  Allergies   Allergen Reactions    Other      Seasonal allergies    Percocet [Oxycodone-Acetaminophen] GI Intolerance    Celecoxib Palpitations       OBJECTIVE    Vitals   Vitals:    12/09/24 0815   BP: 120/70   BP Location: Left arm   Patient Position: Sitting   Cuff Size: Standard   Pulse: 61   SpO2: 99%   Height: 5' 6\" (1.676 m)       PVR:    Physical Exam  Constitutional:       General: She is not in acute distress.     Appearance: Normal appearance. She is normal weight. She is not ill-appearing or toxic-appearing.   HENT:      Head: Normocephalic and atraumatic.   Eyes:      Conjunctiva/sclera: Conjunctivae normal.   Cardiovascular:      Rate and Rhythm: Normal rate.   Pulmonary:      Effort: Pulmonary effort is normal. No respiratory distress.   Skin:     General: Skin is warm and dry.   Neurological:      General: No focal deficit present.      Mental Status: She is alert and oriented to person, place, and time.      Cranial Nerves: No cranial nerve deficit.   Psychiatric:         Mood and Affect: Mood normal.         Behavior: Behavior normal.         Thought Content: Thought content normal.          Labs:     Lab Results   Component Value Date    CREATININE 0.68 05/09/2024      No results found for: " "\"HGBA1C\"  Lab Results   Component Value Date    CALCIUM 9.7 05/09/2024    K 4.0 05/09/2024    CO2 27 05/09/2024     05/09/2024    BUN 12 05/09/2024    CREATININE 0.68 05/09/2024       I have personally reviewed all pertinent lab results and reviewed with patient    Imaging   CT ABDOMEN AND PELVIS WITH AND WITHOUT IV CONTRAST     INDICATION: R31.29: Other microscopic hematuria.     COMPARISON: 10/22/2020     TECHNIQUE: Initial CT of the abdomen and pelvis was performed without intravenous contrast. Subsequent dynamic CT evaluation of the abdomen and pelvis was performed after the administration of contrast in both nephrographic and delayed phases.    Multiplanar 2D reformatted images were created from the source data.     This examination, like all CT scans performed in the [facility], was performed utilizing techniques to minimize radiation dose exposure, including the use of iterative reconstruction and automated exposure control. Radiation dose length   product (DLP) for this visit: 1228 mGy-cm     IV Contrast: 100 mL of iohexol (OMNIPAQUE)  Enteric Contrast: Not administered.     FINDINGS:     ABDOMEN     RIGHT KIDNEY AND URETER:  No solid renal mass or detectable urothelial mass.  No hydronephrosis or hydroureter.  No urinary tract calculi.  No perinephric collection.     LEFT KIDNEY AND URETER:  No solid renal mass or detectable urothelial mass.  Subcentimeter hypoattenuating renal lesion(s), too small to characterize but statistically likely benign, which do not warrant follow-up (Radiology June 2019).  No hydronephrosis or hydroureter.  No urinary tract calculi.  No perinephric collection.     URINARY BLADDER:  No bladder wall mass.  No calculi.        LOWER CHEST: No clinically significant abnormality in the visualized lower chest.     LIVER/BILIARY TREE: Unremarkable.     GALLBLADDER: Post cholecystectomy.     SPLEEN: Unremarkable.     PANCREAS: Unremarkable.     ADRENAL GLANDS: " Unremarkable.     STOMACH AND BOWEL: Possible lipomatous lesion in the pelvic small bowel on series 3 image 133 measuring 1.1 cm.     APPENDIX: No findings to suggest appendicitis.     ABDOMINOPELVIC CAVITY: No ascites. No pneumoperitoneum. No lymphadenopathy.     VESSELS: Atherosclerosis without abdominal aortic aneurysm.     PELVIS     REPRODUCTIVE ORGANS: Post hysterectomy.     ABDOMINAL WALL/INGUINAL REGIONS: Unremarkable.     BONES: No acute fracture or suspicious osseous lesion. Spinal degenerative changes.     IMPRESSION:     1.  No suspicious renal mass or upper tract urothelial lesion.  2.  Urinary bladder without focal wall mass or calculus.  3.  Additional findings discussed above.                 Workstation performed: [id number]       Dakota Hartman PA-C  Date: 12/9/2024 Time: 8:42 AM  [facility]    This note was written using fluency dictation software. Please excuse any resulting minor grammatical errors.

## 2025-01-04 DIAGNOSIS — B00.1 HERPES LABIALIS: ICD-10-CM

## 2025-01-05 RX ORDER — FAMCICLOVIR 500 MG/1
TABLET ORAL
Qty: 15 TABLET | Refills: 1 | Status: SHIPPED | OUTPATIENT
Start: 2025-01-05 | End: 2026-01-05

## 2025-03-10 ENCOUNTER — OFFICE VISIT (OUTPATIENT)
Dept: CARDIOLOGY CLINIC | Facility: CLINIC | Age: 65
End: 2025-03-10
Payer: COMMERCIAL

## 2025-03-10 VITALS
HEART RATE: 61 BPM | BODY MASS INDEX: 25.23 KG/M2 | HEIGHT: 66 IN | DIASTOLIC BLOOD PRESSURE: 76 MMHG | OXYGEN SATURATION: 99 % | SYSTOLIC BLOOD PRESSURE: 130 MMHG | WEIGHT: 157 LBS

## 2025-03-10 DIAGNOSIS — E78.5 DYSLIPIDEMIA: ICD-10-CM

## 2025-03-10 DIAGNOSIS — G89.4 CHRONIC PAIN SYNDROME: ICD-10-CM

## 2025-03-10 DIAGNOSIS — F41.1 GAD (GENERALIZED ANXIETY DISORDER): ICD-10-CM

## 2025-03-10 DIAGNOSIS — R00.2 PALPITATIONS: ICD-10-CM

## 2025-03-10 PROCEDURE — 93000 ELECTROCARDIOGRAM COMPLETE: CPT | Performed by: INTERNAL MEDICINE

## 2025-03-10 PROCEDURE — 99203 OFFICE O/P NEW LOW 30 MIN: CPT | Performed by: INTERNAL MEDICINE

## 2025-03-10 NOTE — LETTER
March 10, 2025     Nikita Aponte DO  [address] [facility]  [address] [place] [id number]    Patient: Jayda Clayton   YOB: 1960   Date of Visit: 3/10/2025       Dear Dr. Aponte:    Thank you for referring Jayda Clayton to me for evaluation. Below are my notes for this consultation.    If you have questions, please do not hesitate to call me. I look forward to following your patient along with you.         Sincerely,        Flako Johnson MD        CC: No Recipients    Flako Johnson MD  3/10/2025  4:00 PM  Incomplete    Consultation - Cardiology Office  [facility].    Jayda Clayton 64 y.o. female MRN: [id number]  : 1960  Unit/Bed#:  Encounter: [id number]      Assessment:     Assessment & Plan  Palpitations    Dyslipidemia    ALEX (generalized anxiety disorder)    Chronic pain syndrome         Discussion summary and Plan:        Patient / Caretaker was advised and educated to call our office  immediately if  patient has any new symptoms of chest pain/shortness of breath, near-syncope, syncope, light headedness sustained palpitations or any other cardiovascular symptoms before their scheduled follow-up appointment.  Office number was provided # [phone]    Thank you for your consultation.  If you have any question please call me at [phone]    Counseling :  A description of the counseling.  Goals and Barriers.  Patient's ability to self care: Yes  Medication side effect reviewed with patient in detail and all their questions answered to their satisfaction.      Primary Care Physician Requesting Consult: Nikita Aponte DO    Reason for Consult / Principal Problem: Palpitations, chronic pain syndrome, dyslipidemia        HPI :     Jayda Clayton is a 64 y.o. year old female who was referred by primary care doctor for palpitations.  Patient started having palpitations few months ago.  She found out she was eating too much of MSG and cut back since then she did not  have much palpitations.  She has a medical history significant for chronic pain syndrome/fibromyalgia, dyslipidemia with risk for cardiovascular event around 5 to 7%, previous history of tobacco abuse where she smoked 3 to 5 cigarettes a day now quit smoking who came because of palpitations EKG shows sinus them with a heart rate 61 bpm.  There is no family history of premature coronary artery disease she has her echo Doppler done in 2022 which shows she has normal LV systolic function no significant valvular disease.  No other cardiovascular issues.    She denies any family history of heart disease.  Her palpitations have already stopped now.  She believes she had a stress test done many years ago which was acceptable.  No other cardiovascular issues.    Review of Systems   Constitutional:  Negative for activity change, chills, diaphoresis, fever and unexpected weight change.   HENT:  Negative for congestion.    Eyes:  Negative for discharge and redness.   Respiratory:  Negative for cough, chest tightness, shortness of breath and wheezing.    Cardiovascular: Negative.  Negative for chest pain, palpitations and leg swelling.   Gastrointestinal:  Negative for abdominal pain, diarrhea and nausea.   Endocrine: Negative.    Genitourinary:  Negative for decreased urine volume and urgency.   Musculoskeletal: Negative.  Negative for arthralgias, back pain and gait problem.        Chronic pain due to fibromyalgia   Skin:  Negative for rash and wound.   Allergic/Immunologic: Negative.    Neurological:  Negative for dizziness, seizures, syncope, weakness, light-headedness and headaches.   Hematological: Negative.    Psychiatric/Behavioral:  Negative for agitation and confusion. The patient is not nervous/anxious.        Historical Information  Past Medical History:   Diagnosis Date   • Migraines    • Neck pain    • Osteopenia    • Seasonal allergic reaction      Past Surgical History:   Procedure Laterality Date   • ANAL  FISSURECTOMY     • CHOLECYSTECTOMY      laparoscopy   • EPIDURAL BLOCK INJECTION N/A 2016    Procedure: BLOCK / INJECTION EPIDURAL STEROID CERVICAL  C7-T1;  Surgeon: Familia Ferreira MD;  Location: [facility] MAIN OR;  Service:    • EPIDURAL BLOCK INJECTION N/A 2016    Procedure: BLOCK / INJECTION EPIDURAL STEROID CERVICAL  C7-T1;  Surgeon: Familia Ferreira MD;  Location: [facility] MAIN OR;  Service:    • FRACTURE SURGERY      right ankle   • HYSTERECTOMY     • OOPHORECTOMY Bilateral    • [place] EXCISION GANGLION WRIST DORSAL/VOLAR PRIMARY Right 2021    Procedure: EXCISION GANGLION CYST RIGHT HAND;  Surgeon: Randall Ch DO;  Location: [facility] MAIN OR;  Service: Orthopedics   • TN HALLUX RIGIDUS W/CHEILECTOMY 1ST MP JT W/O IMPLT Left 2023    Procedure: CHEILECTOMY LEFT GREAT TOE JOINT;  Surgeon: Abhishek Silvestre DPM;  Location:  MAIN OR;  Service: Podiatry   • TN TENDON SHEATH INCISION Right 2021    Procedure: RIGHT LONG FINGER A 1 PULLEY RELEASE,;  Surgeon: Randall Ch DO;  Location: [facility] MAIN OR;  Service: Orthopedics   • ROTATOR CUFF REPAIR Right      Social History     Substance and Sexual Activity   Alcohol Use Not Currently    Comment: social     Social History     Substance and Sexual Activity   Drug Use No     Social History     Tobacco Use   Smoking Status Former   • Current packs/day: 0.00   • Average packs/day: 0.3 packs/day for 40.0 years (10.0 ttl pk-yrs)   • Types: Cigarettes   • Start date:    • Quit date: 2020   • Years since quittin.1   • Passive exposure: Past   Smokeless Tobacco Never     Family History:   Family History   Problem Relation Age of Onset   • Depression Mother    • No Known Problems Father    • Depression Sister    • Lung cancer Maternal Grandmother    • Cancer Maternal Grandmother         Bone cancer   • No Known Problems Maternal Grandfather    • No Known Problems Paternal Grandmother    • Kidney cancer Paternal Grandfather    • Cancer Paternal  "Grandfather         Kidney cancer   • No Known Problems Maternal Aunt    • Depression Maternal Aunt    • No Known Problems Paternal Aunt    • No Known Problems Paternal Aunt        Meds/Allergies    Allergies   Allergen Reactions   • Other      Seasonal allergies   • Percocet [Oxycodone-Acetaminophen] GI Intolerance   • Celecoxib Palpitations       Current Outpatient Medications:   •  estradiol (ESTRACE VAGINAL) 0.1 mg/g vaginal cream, Insert 1 g into the vagina 3 (three) times a week, Disp: 42.5 g, Rfl: 0  •  famciclovir (FAMVIR) 500 mg tablet, TAKE 3 TABLETS TO START ONCE AT EARLIEST ONSET OF COLD SORE, Disp: 15 tablet, Rfl: 1  •  rizatriptan (MAXALT) 10 mg tablet, 1 po q2hrs prn migraine, 30mg/max in 24 hrs, Disp: 9 tablet, Rfl: 5  •  meloxicam (Mobic) 7.5 mg tablet, Take 1 tablet (7.5 mg total) by mouth daily for 28 days, Disp: 28 tablet, Rfl: 0  •  solifenacin (VESICARE) 10 MG tablet, Take 1 tablet (10 mg total) by mouth daily, Disp: 30 tablet, Rfl: 5    Current Facility-Administered Medications:   •  bupivacaine (PF) (MARCAINE) 0.25 % injection 1 mL, 1 mL, Infiltration, , Abhishek Acuña Silvestre, DPM, 1 mL at 12/13/23 1600  •  triamcinolone acetonide (KENALOG-40) 40 mg/mL injection 20 mg, 20 mg, Infiltration, , Abhishek Acuña Silvestre, DPM, 20 mg at 12/13/23 1600    Vitals: Blood pressure 130/76, pulse 61, height 5' 6\" (1.676 m), weight 71.2 kg (157 lb), SpO2 99%.    Body mass index is 25.34 kg/m².  Wt Readings from Last 3 Encounters:   03/10/25 71.2 kg (157 lb)   10/31/24 69.9 kg (154 lb)   09/20/24 70.6 kg (155 lb 9.6 oz)     Vitals:    03/10/25 1531   Weight: 71.2 kg (157 lb)     BP Readings from Last 3 Encounters:   03/10/25 130/76   12/09/24 120/70   10/31/24 108/62         Physical Exam    Neurologic:  Alert & oriented x 3, no new focal deficits, Not in any acute distress,  Constitutional:  Adequate built, non-toxic appearance   Neck: Normal range of motion, no tenderness,  Neck supple   Respiratory:  Bilateral air " entry, mostly clear to auscultation  Cardiovascular: S1-S2 regular with a 2/6 ejection systolic murmur and S4 is present  GI:  Soft, nondistended,  nontender, no hepatosplenomegaly appreciated.  Musculoskeletal:  No edema, no tenderness, no deformities.   Skin:  Well hydrated, no rash   Extremities:  No edema and distal pulses are present  Psychiatric:  Speech and behavior appropriate     Diagnostic Studies Review Cardio:    Echo Doppler.  Echo Doppler July 2022 shows EF 60% no significant valvular disease.      EKG:    Twelve-lead EKG 3/10/2025 shows sinus rhythm with a heart rate 61 bpm.    Imaging:  Chest X-Ray:   XR chest pa & lateral  Result Date: 5/14/2024  Impression No acute cardiopulmonary disease. Workstation performed: [id number]     Lab Review   Lab Results   Component Value Date    WBC 6.59 05/09/2024    HGB 13.4 05/09/2024    HCT 40.0 05/09/2024    MCV 93 05/09/2024    RDW 12.1 05/09/2024     05/09/2024     BMP:  Lab Results   Component Value Date    SODIUM 138 05/09/2024    K 4.0 05/09/2024     05/09/2024    CO2 27 05/09/2024    BUN 12 05/09/2024    CREATININE 0.68 05/09/2024    GLUC 80 04/03/2023    GLUF 92 05/09/2024    CALCIUM 9.7 05/09/2024    EGFR 94 05/09/2024     Troponins:    LFT:  Lab Results   Component Value Date    AST 16 05/09/2024    ALT 13 05/09/2024    ALKPHOS 70 05/09/2024    TP 7.3 05/09/2024    ALB 4.5 05/09/2024      Lab Results   Component Value Date    QKD1SMLSSTCJ 2.119 05/09/2024       Lipid Profile:   Lab Results   Component Value Date    CHOLESTEROL 224 (H) 05/09/2024    HDL 62 05/09/2024    LDLCALC 145 (H) 05/09/2024    TRIG 84 05/09/2024     Lab Results   Component Value Date    CHOLESTEROL 224 (H) 05/09/2024    CHOLESTEROL 202 (H) 01/12/2023     The 10-year ASCVD risk score (Xi DE LA CRUZ, et al., 2019) is: 4.7%    Values used to calculate the score:      Age: 64 years      Sex: Female      Is Non- : No      Diabetic: No      Tobacco  "smoker: No      Systolic Blood Pressure: 130 mmHg      Is BP treated: No      HDL Cholesterol: 62 mg/dL      Total Cholesterol: 224 mg/dL         Dr. Flako Johnson MD [facility]      \"This note was completed in part utilizing m-modal fluency direct voice recognition software.   Grammatical errors, random word insertion, spelling mistakes, and incomplete sentences may be an occasional consequence of the system secondary to software limitations, ambient noise and hardware issues.    Please read the chart carefully and recognize, using context, where substitutions have occurred.  If you have any questions or concerns about the context, text or information contained within the body of this dictation, please contact myself, the provider, for further clarification.\"  "

## 2025-03-10 NOTE — PROGRESS NOTES
Consultation - Cardiology Office  [facility].    Jayda Clayton 64 y.o. female MRN: [id number]  : 1960  Unit/Bed#:  Encounter: [id number]      Assessment:     Assessment & Plan  Palpitations  Patient had history of palpitations.  She feels her palpitation improved as she changed her diet.  She does not have any palpitation she has briefly normal LV systolic function.  Will check echo Doppler.  If there is any reoccurrence of palpitation she will need monitoring.  Dyslipidemia  Risk for cardiovascular event is around 5%.  She is reluctant for statin check coronary calcium score.  ALEX (generalized anxiety disorder)  Currently stable.  Chronic pain syndrome  History of fibromyalgia and chronic pain syndrome management as per medical team       Discussion summary and Plan:    Exercise stress test, coronary calcium score.  Pathophysiology of CAD discussed for now she is reluctant for statin therapy.  She is aware that her cholesterol is high she is scheduled to have repeat blood test.      Patient / Caretaker was advised and educated to call our office  immediately if  patient has any new symptoms of chest pain/shortness of breath, near-syncope, syncope, light headedness sustained palpitations or any other cardiovascular symptoms before their scheduled follow-up appointment.  Office number was provided # [phone]    Thank you for your consultation.  If you have any question please call me at [phone]    Counseling :  A description of the counseling.  Goals and Barriers.  Patient's ability to self care: Yes  Medication side effect reviewed with patient in detail and all their questions answered to their satisfaction.      Primary Care Physician Requesting Consult: Nikita Aponte DO    Reason for Consult / Principal Problem: Palpitations, chronic pain syndrome, dyslipidemia        HPI :     Jayda Clayton is a 64 y.o. year old female who was referred by primary care doctor for  palpitations.  Patient started having palpitations few months ago.  She found out she was eating too much of MSG and cut back since then she did not have much palpitations.  She has a medical history significant for chronic pain syndrome/fibromyalgia, dyslipidemia with risk for cardiovascular event around 5 to 7%, previous history of tobacco abuse where she smoked 3 to 5 cigarettes a day now quit smoking who came because of palpitations EKG shows sinus them with a heart rate 61 bpm.  There is no family history of premature coronary artery disease she has her echo Doppler done in 2022 which shows she has normal LV systolic function no significant valvular disease.  No other cardiovascular issues.    She denies any family history of heart disease.  Her palpitations have already stopped now.  She believes she had a stress test done many years ago which was acceptable.  No other cardiovascular issues.    Review of Systems   Constitutional:  Negative for activity change, chills, diaphoresis, fever and unexpected weight change.   HENT:  Negative for congestion.    Eyes:  Negative for discharge and redness.   Respiratory:  Negative for cough, chest tightness, shortness of breath and wheezing.    Cardiovascular: Negative.  Negative for chest pain, palpitations and leg swelling.   Gastrointestinal:  Negative for abdominal pain, diarrhea and nausea.   Endocrine: Negative.    Genitourinary:  Negative for decreased urine volume and urgency.   Musculoskeletal: Negative.  Negative for arthralgias, back pain and gait problem.        Chronic pain due to fibromyalgia   Skin:  Negative for rash and wound.   Allergic/Immunologic: Negative.    Neurological:  Negative for dizziness, seizures, syncope, weakness, light-headedness and headaches.   Hematological: Negative.    Psychiatric/Behavioral:  Negative for agitation and confusion. The patient is not nervous/anxious.        Historical Information   Past Medical History:   Diagnosis  Date   • Migraines    • Neck pain    • Osteopenia    • Seasonal allergic reaction      Past Surgical History:   Procedure Laterality Date   • ANAL FISSURECTOMY     • CHOLECYSTECTOMY      laparoscopy   • EPIDURAL BLOCK INJECTION N/A 2016    Procedure: BLOCK / INJECTION EPIDURAL STEROID CERVICAL  C7-T1;  Surgeon: Familia Ferreira MD;  Location: [facility] MAIN OR;  Service:    • EPIDURAL BLOCK INJECTION N/A 2016    Procedure: BLOCK / INJECTION EPIDURAL STEROID CERVICAL  C7-T1;  Surgeon: Familia Ferreira MD;  Location: [facility] MAIN OR;  Service:    • FRACTURE SURGERY      right ankle   • HYSTERECTOMY     • OOPHORECTOMY Bilateral    • W[place] EXCISION GANGLION WRIST DORSAL/VOLAR PRIMARY Right 2021    Procedure: EXCISION GANGLION CYST RIGHT HAND;  Surgeon: Randall Ch DO;  Location: [facility] MAIN OR;  Service: Orthopedics   • WV HALLUX RIGIDUS W/CHEILECTOMY 1ST MP JT W/O IMPLT Left 2023    Procedure: CHEILECTOMY LEFT GREAT TOE JOINT;  Surgeon: Abhishek Silvestre DPM;  Location:  MAIN OR;  Service: Podiatry   • WV TENDON SHEATH INCISION Right 2021    Procedure: RIGHT LONG FINGER A 1 PULLEY RELEASE,;  Surgeon: Randall Ch DO;  Location: [facility] MAIN OR;  Service: Orthopedics   • ROTATOR CUFF REPAIR Right      Social History     Substance and Sexual Activity   Alcohol Use Not Currently    Comment: social     Social History     Substance and Sexual Activity   Drug Use No     Social History     Tobacco Use   Smoking Status Former   • Current packs/day: 0.00   • Average packs/day: 0.3 packs/day for 40.0 years (10.0 ttl pk-yrs)   • Types: Cigarettes   • Start date:    • Quit date: 2020   • Years since quittin.1   • Passive exposure: Past   Smokeless Tobacco Never     Family History:   Family History   Problem Relation Age of Onset   • Depression Mother    • No Known Problems Father    • Depression Sister    • Lung cancer Maternal Grandmother    • Cancer Maternal Grandmother         Bone cancer   • No  "Known Problems Maternal Grandfather    • No Known Problems Paternal Grandmother    • Kidney cancer Paternal Grandfather    • Cancer Paternal Grandfather         Kidney cancer   • No Known Problems Maternal Aunt    • Depression Maternal Aunt    • No Known Problems Paternal Aunt    • No Known Problems Paternal Aunt        Meds/Allergies     Allergies   Allergen Reactions   • Other      Seasonal allergies   • Percocet [Oxycodone-Acetaminophen] GI Intolerance   • Celecoxib Palpitations       Current Outpatient Medications:   •  estradiol (ESTRACE VAGINAL) 0.1 mg/g vaginal cream, Insert 1 g into the vagina 3 (three) times a week, Disp: 42.5 g, Rfl: 0  •  famciclovir (FAMVIR) 500 mg tablet, TAKE 3 TABLETS TO START ONCE AT EARLIEST ONSET OF COLD SORE, Disp: 15 tablet, Rfl: 1  •  rizatriptan (MAXALT) 10 mg tablet, 1 po q2hrs prn migraine, 30mg/max in 24 hrs, Disp: 9 tablet, Rfl: 5  •  meloxicam (Mobic) 7.5 mg tablet, Take 1 tablet (7.5 mg total) by mouth daily for 28 days, Disp: 28 tablet, Rfl: 0  •  solifenacin (VESICARE) 10 MG tablet, Take 1 tablet (10 mg total) by mouth daily, Disp: 30 tablet, Rfl: 5    Current Facility-Administered Medications:   •  bupivacaine (PF) (MARCAINE) 0.25 % injection 1 mL, 1 mL, Infiltration, , Abhishek Silvestre, DPM, 1 mL at 12/13/23 1600  •  triamcinolone acetonide (KENALOG-40) 40 mg/mL injection 20 mg, 20 mg, Infiltration, , Abhishek Silvestre, DPM, 20 mg at 12/13/23 1600    Vitals: Blood pressure 130/76, pulse 61, height 5' 6\" (1.676 m), weight 71.2 kg (157 lb), SpO2 99%.    Body mass index is 25.34 kg/m².  Wt Readings from Last 3 Encounters:   03/10/25 71.2 kg (157 lb)   10/31/24 69.9 kg (154 lb)   09/20/24 70.6 kg (155 lb 9.6 oz)     Vitals:    03/10/25 1531   Weight: 71.2 kg (157 lb)     BP Readings from Last 3 Encounters:   03/10/25 130/76   12/09/24 120/70   10/31/24 108/62         Physical Exam    Neurologic:  Alert & oriented x 3, no new focal deficits, Not in any acute " distress,  Constitutional:  Adequate built, non-toxic appearance   Neck: Normal range of motion, no tenderness,  Neck supple   Respiratory:  Bilateral air entry, mostly clear to auscultation  Cardiovascular: S1-S2 regular with a 2/6 ejection systolic murmur and S4 is present  GI:  Soft, nondistended,  nontender, no hepatosplenomegaly appreciated.  Musculoskeletal:  No edema, no tenderness, no deformities.   Skin:  Well hydrated, no rash   Extremities:  No edema and distal pulses are present  Psychiatric:  Speech and behavior appropriate     Diagnostic Studies Review Cardio:    Echo Doppler.  Echo Doppler July 2022 shows EF 60% no significant valvular disease.      EKG:    Twelve-lead EKG 3/10/2025 shows sinus rhythm with a heart rate 61 bpm.    Imaging:  Chest X-Ray:   XR chest pa & lateral  Result Date: 5/14/2024  Impression No acute cardiopulmonary disease. Workstation performed: [id number]     Lab Review   Lab Results   Component Value Date    WBC 6.59 05/09/2024    HGB 13.4 05/09/2024    HCT 40.0 05/09/2024    MCV 93 05/09/2024    RDW 12.1 05/09/2024     05/09/2024     BMP:  Lab Results   Component Value Date    SODIUM 138 05/09/2024    K 4.0 05/09/2024     05/09/2024    CO2 27 05/09/2024    BUN 12 05/09/2024    CREATININE 0.68 05/09/2024    GLUC 80 04/03/2023    GLUF 92 05/09/2024    CALCIUM 9.7 05/09/2024    EGFR 94 05/09/2024     Troponins:    LFT:  Lab Results   Component Value Date    AST 16 05/09/2024    ALT 13 05/09/2024    ALKPHOS 70 05/09/2024    TP 7.3 05/09/2024    ALB 4.5 05/09/2024      Lab Results   Component Value Date    [id number] 2.119 05/09/2024       Lipid Profile:   Lab Results   Component Value Date    CHOLESTEROL 224 (H) 05/09/2024    HDL 62 05/09/2024    LDLCALC 145 (H) 05/09/2024    TRIG 84 05/09/2024     Lab Results   Component Value Date    CHOLESTEROL 224 (H) 05/09/2024    CHOLESTEROL 202 (H) 01/12/2023     The 10-year ASCVD risk score (Xi DE LA CRUZ, et al., 2019) is:  "4.7%    Values used to calculate the score:      Age: 64 years      Sex: Female      Is Non- : No      Diabetic: No      Tobacco smoker: No      Systolic Blood Pressure: 130 mmHg      Is BP treated: No      HDL Cholesterol: 62 mg/dL      Total Cholesterol: 224 mg/dL         Dr. Flako Johnson MD [facility]      \"This note was completed in part utilizing [employer] direct voice recognition software.   Grammatical errors, random word insertion, spelling mistakes, and incomplete sentences may be an occasional consequence of the system secondary to software limitations, ambient noise and hardware issues.    Please read the chart carefully and recognize, using context, where substitutions have occurred.  If you have any questions or concerns about the context, text or information contained within the body of this dictation, please contact myself, the provider, for further clarification.\"  "

## 2025-03-13 ENCOUNTER — TELEPHONE (OUTPATIENT)
Dept: UROLOGY | Facility: CLINIC | Age: 65
End: 2025-03-13

## 2025-03-13 ENCOUNTER — APPOINTMENT (OUTPATIENT)
Dept: LAB | Facility: CLINIC | Age: 65
End: 2025-03-13
Payer: COMMERCIAL

## 2025-03-13 ENCOUNTER — PATIENT MESSAGE (OUTPATIENT)
Dept: UROLOGY | Facility: CLINIC | Age: 65
End: 2025-03-13

## 2025-03-13 DIAGNOSIS — R39.9 UTI SYMPTOMS: Primary | ICD-10-CM

## 2025-03-13 DIAGNOSIS — R39.9 UTI SYMPTOMS: ICD-10-CM

## 2025-03-13 DIAGNOSIS — R39.15 URGENCY OF URINATION: ICD-10-CM

## 2025-03-13 DIAGNOSIS — N30.11 INTERSTITIAL CYSTITIS (CHRONIC) WITH HEMATURIA: ICD-10-CM

## 2025-03-13 LAB
BACTERIA UR QL AUTO: ABNORMAL /HPF
BILIRUB UR QL STRIP: NEGATIVE
CLARITY UR: CLEAR
COLOR UR: COLORLESS
GLUCOSE UR STRIP-MCNC: NEGATIVE MG/DL
HGB UR QL STRIP.AUTO: ABNORMAL
KETONES UR STRIP-MCNC: NEGATIVE MG/DL
LEUKOCYTE ESTERASE UR QL STRIP: ABNORMAL
NITRITE UR QL STRIP: NEGATIVE
NON-SQ EPI CELLS URNS QL MICRO: ABNORMAL /HPF
PH UR STRIP.AUTO: 7 [PH]
PROT UR STRIP-MCNC: NEGATIVE MG/DL
RBC #/AREA URNS AUTO: ABNORMAL /HPF
SP GR UR STRIP.AUTO: 1.01 (ref 1–1.03)
UROBILINOGEN UR STRIP-ACNC: <2 MG/DL
WBC #/AREA URNS AUTO: ABNORMAL /HPF

## 2025-03-13 PROCEDURE — 81001 URINALYSIS AUTO W/SCOPE: CPT

## 2025-03-13 RX ORDER — CEPHALEXIN 500 MG/1
500 CAPSULE ORAL EVERY 12 HOURS SCHEDULED
Qty: 6 CAPSULE | Refills: 1 | Status: SHIPPED | OUTPATIENT
Start: 2025-03-13 | End: 2025-03-16

## 2025-03-13 NOTE — TELEPHONE ENCOUNTER
See mychart messages. Called and spoke to pt. Shiva's message relayed. Pt verbalized understanding. Clinical to monitor for urine testing results.     Shiva Melara PA-C to [facility] [place] [facility]   EZ    3/13/25 10:49 AM  Let patient know Keflex twice a day for 3 days was sent to pharmacy.  Do not start medication until culture comes back indicating bacterial growth.  1 refill was placed on the medication if needed

## 2025-03-13 NOTE — TELEPHONE ENCOUNTER
Patient called the RX Refill Line. Message is being forwarded to the office.     Patient is requesting antibiotic for an infection. Patient is going for a urinalysis today, she stated she isn't having that much pain but theres the order so she feels like an infection is starting.     Please contact patient at [phone]

## 2025-03-14 ENCOUNTER — APPOINTMENT (OUTPATIENT)
Dept: LAB | Facility: CLINIC | Age: 65
End: 2025-03-14
Payer: COMMERCIAL

## 2025-03-14 ENCOUNTER — HOSPITAL ENCOUNTER (OUTPATIENT)
Dept: CT IMAGING | Facility: HOSPITAL | Age: 65
Discharge: HOME | End: 2025-03-14
Attending: INTERNAL MEDICINE
Payer: COMMERCIAL

## 2025-03-14 ENCOUNTER — RESULTS FOLLOW-UP (OUTPATIENT)
Dept: UROLOGY | Facility: AMBULATORY SURGERY CENTER | Age: 65
End: 2025-03-14

## 2025-03-14 DIAGNOSIS — R00.2 PALPITATIONS: ICD-10-CM

## 2025-03-14 DIAGNOSIS — E78.5 DYSLIPIDEMIA: ICD-10-CM

## 2025-03-14 DIAGNOSIS — N30.11 INTERSTITIAL CYSTITIS (CHRONIC) WITH HEMATURIA: ICD-10-CM

## 2025-03-14 DIAGNOSIS — G89.4 CHRONIC PAIN SYNDROME: ICD-10-CM

## 2025-03-14 DIAGNOSIS — R39.15 URGENCY OF URINATION: ICD-10-CM

## 2025-03-14 DIAGNOSIS — F41.1 GAD (GENERALIZED ANXIETY DISORDER): ICD-10-CM

## 2025-03-14 PROCEDURE — 87186 SC STD MICRODIL/AGAR DIL: CPT

## 2025-03-14 PROCEDURE — 75571 CT HRT W/O DYE W/CA TEST: CPT

## 2025-03-14 PROCEDURE — 87077 CULTURE AEROBIC IDENTIFY: CPT

## 2025-03-14 PROCEDURE — 87086 URINE CULTURE/COLONY COUNT: CPT

## 2025-03-14 NOTE — TELEPHONE ENCOUNTER
"Called/spoke to patient letting her know that after she reported to the lab yesterday (3/13/25), we can see that the urinalysis was done and resulted, but there is no culture in process. I assured her that both urine tests were ordered at the same time, I can see them in her chart, but that the culture is still marked as \"active - needs to be collected\". If she is still experiencing UTI symptoms, it is recommended, unfortunately, that she go back to the lab to obtain a culture so we can assess for any bacteria.     Patient was slightly annoyed, not with us, but with the lab. She stated the girl checking her in was new and did not seem to know exactly what she was doing. She does still have an odor and is agreeable to the culture; just annoyed about the inconvenience of it all. I suggested that she call the lab she went to ahead of time to make sure they see the order before she makes a wasted trip there. She agreed that it was a good idea and will do that, probably today.           ----- Message from MIRANDA Valles sent at 3/14/2025  7:37 AM EDT -----  Patient reported to the lab yesterday got a urinalysis done with there is no urine culture in process, unsure if she is experiencing UTI symptoms.  If she has I would recommend going back to the lab to obtain a urine culture so we can assess for any bacteria.  "

## 2025-03-14 NOTE — TELEPHONE ENCOUNTER
Patient calling back stating that she had culture done. She has antibiotic to start. Pending UC results.

## 2025-03-16 LAB — BACTERIA UR CULT: ABNORMAL

## 2025-03-17 RX ORDER — SULFAMETHOXAZOLE AND TRIMETHOPRIM 800; 160 MG/1; MG/1
1 TABLET ORAL EVERY 12 HOURS SCHEDULED
Qty: 10 TABLET | Refills: 0 | Status: SHIPPED | OUTPATIENT
Start: 2025-03-17 | End: 2025-03-22

## 2025-03-17 NOTE — TELEPHONE ENCOUNTER
UCX finalized. resistant to keflex.     do not take the keflex i am sending bactrim as susceptible alternative

## 2025-03-21 ENCOUNTER — RESULTS FOLLOW-UP (OUTPATIENT)
Dept: CARDIOLOGY CLINIC | Facility: CLINIC | Age: 65
End: 2025-03-21

## 2025-03-21 NOTE — TELEPHONE ENCOUNTER
----- Message from Flako Johnson MD sent at 3/21/2025  9:46 AM EDT -----  Patient does have some plaque in her coronary arteries though the score is still low.  She can continue dietary control or she can be considered for low-dose statin therapy.  When we have some calcium positive it means there is some plaque performed now continue medical Rx and she should keep appointment.

## 2025-03-24 ENCOUNTER — HOSPITAL ENCOUNTER (OUTPATIENT)
Dept: NON INVASIVE DIAGNOSTICS | Facility: HOSPITAL | Age: 65
Discharge: HOME | End: 2025-03-24
Attending: INTERNAL MEDICINE
Payer: COMMERCIAL

## 2025-03-24 DIAGNOSIS — G89.4 CHRONIC PAIN SYNDROME: ICD-10-CM

## 2025-03-24 DIAGNOSIS — F41.1 GAD (GENERALIZED ANXIETY DISORDER): ICD-10-CM

## 2025-03-24 DIAGNOSIS — E78.5 DYSLIPIDEMIA: ICD-10-CM

## 2025-03-24 DIAGNOSIS — R00.2 PALPITATIONS: ICD-10-CM

## 2025-03-24 LAB
CHEST PAIN STATEMENT: NORMAL
MAX DIASTOLIC BP: 68 MMHG
MAX HR PERCENT: 108 %
MAX HR: 171 BPM
MAX PREDICTED HEART RATE: 157 BPM
PROTOCOL NAME: NORMAL
RATE PRESSURE PRODUCT: NORMAL
REASON FOR TERMINATION: NORMAL
SL CV STRESS RECOVERY BP: NORMAL MMHG
SL CV STRESS RECOVERY HR: 102 BPM
SL CV STRESS RECOVERY O2 SAT: 99 %
SL CV STRESS STAGE REACHED: 4
STRESS ANGINA INDEX: 0
STRESS BASELINE BP: NORMAL MMHG
STRESS BASELINE HR: 86 BPM
STRESS O2 SAT REST: 98 %
STRESS PEAK HR: 171 BPM
STRESS POST ESTIMATED WORKLOAD: 9.4 METS
STRESS POST EXERCISE DUR MIN: 9 MIN
STRESS POST EXERCISE DUR MIN: 9 MIN
STRESS POST EXERCISE DUR SEC: 40 SEC
STRESS POST EXERCISE DUR SEC: 40 SEC
STRESS POST O2 SAT PEAK: 100 %
STRESS POST PEAK BP: 170 MMHG
STRESS POST PEAK HR: 171 BPM
STRESS POST PEAK SYSTOLIC BP: 170 MMHG
TARGET HR FORMULA: NORMAL
TEST INDICATION: NORMAL

## 2025-03-24 PROCEDURE — 93017 CV STRESS TEST TRACING ONLY: CPT

## 2025-03-24 PROCEDURE — 93018 CV STRESS TEST I&R ONLY: CPT | Performed by: INTERNAL MEDICINE

## 2025-03-24 PROCEDURE — 93016 CV STRESS TEST SUPVJ ONLY: CPT | Performed by: INTERNAL MEDICINE

## 2025-03-25 DIAGNOSIS — R00.2 PALPITATIONS: Primary | ICD-10-CM

## 2025-03-25 DIAGNOSIS — E78.5 DYSLIPIDEMIA: ICD-10-CM

## 2025-03-25 DIAGNOSIS — R94.39 ABNORMAL STRESS ECG WITH TREADMILL: ICD-10-CM

## 2025-03-25 NOTE — TELEPHONE ENCOUNTER
----- Message from Flako Johnson MD sent at 3/25/2025  8:57 AM EDT -----  Patient's stress test is not normal.  It is at dialysis equivocal due to some EKG changes.  I recommend stress test with imaging modality like nuclear stress test.  If patient agrees let me know I will order it.

## 2025-03-25 NOTE — TELEPHONE ENCOUNTER
I spoke with patient, made aware of results.   She is willing to do nuclear stress test and asked that we send a Incoming Media message with central scheduling phone number once order is placed.

## 2025-03-26 ENCOUNTER — RESULTS FOLLOW-UP (OUTPATIENT)
Dept: UROLOGY | Facility: CLINIC | Age: 65
End: 2025-03-26

## 2025-03-26 NOTE — TELEPHONE ENCOUNTER
pt stated she is feeling better after the bactrim     ----- Message from Shiva Melara PA-C sent at 3/26/2025  9:34 AM EDT -----  Regarding: UTI  Bactrim twice a day for 5 days was prescribed on 03/17/2025  See outpatients feeling and if still symptomatic will need follow-up prior to her previously scheduled 1  ----- Message -----  From: Lab, Background User  Sent: 3/15/2025   4:27 PM EDT  To: Song Barclay MD

## 2025-04-07 ENCOUNTER — PATIENT MESSAGE (OUTPATIENT)
Dept: UROLOGY | Facility: CLINIC | Age: 65
End: 2025-04-07

## 2025-04-07 DIAGNOSIS — R39.9 UTI SYMPTOMS: Primary | ICD-10-CM

## 2025-04-11 ENCOUNTER — APPOINTMENT (OUTPATIENT)
Dept: LAB | Facility: CLINIC | Age: 65
End: 2025-04-11
Attending: PHYSICIAN ASSISTANT
Payer: COMMERCIAL

## 2025-04-11 ENCOUNTER — PATIENT MESSAGE (OUTPATIENT)
Dept: UROLOGY | Facility: CLINIC | Age: 65
End: 2025-04-11

## 2025-04-11 DIAGNOSIS — R39.9 UTI SYMPTOMS: ICD-10-CM

## 2025-04-11 LAB
BACTERIA UR QL AUTO: ABNORMAL /HPF
BILIRUB UR QL STRIP: NEGATIVE
CLARITY UR: CLEAR
COLOR UR: COLORLESS
GLUCOSE UR STRIP-MCNC: NEGATIVE MG/DL
HGB UR QL STRIP.AUTO: ABNORMAL
KETONES UR STRIP-MCNC: NEGATIVE MG/DL
LEUKOCYTE ESTERASE UR QL STRIP: NEGATIVE
NITRITE UR QL STRIP: NEGATIVE
NON-SQ EPI CELLS URNS QL MICRO: ABNORMAL /HPF
PH UR STRIP.AUTO: 6.5 [PH]
PROT UR STRIP-MCNC: NEGATIVE MG/DL
RBC #/AREA URNS AUTO: ABNORMAL /HPF
SP GR UR STRIP.AUTO: 1 (ref 1–1.03)
UROBILINOGEN UR STRIP-ACNC: <2 MG/DL
WBC #/AREA URNS AUTO: ABNORMAL /HPF

## 2025-04-11 PROCEDURE — 87086 URINE CULTURE/COLONY COUNT: CPT

## 2025-04-11 PROCEDURE — 81001 URINALYSIS AUTO W/SCOPE: CPT

## 2025-04-12 LAB — BACTERIA UR CULT: NORMAL

## 2025-04-14 NOTE — PATIENT COMMUNICATION
Urine culture  Order: [id number]   Status: Final result       Next appt: 06/10/2025 at 08:00 AM in Urology (Song Barclay MD)       Dx: UTI symptoms    Test Result Released: Yes (seen)    Specimen Information: Urine, Other   0 Result Notes  Urine Culture No Growth <1000 cfu/mL             Specimen Collected: 04/11/25 12:00        Contains abnormal data Urinalysis with microscopic  Order: [id number]   Status: Final result       Next appt: 06/10/2025 at 08:00 AM in Urology (Song Barclay MD)       Dx: UTI symptoms    Test Result Released: Yes (seen)    0 Result Notes              Component  Ref Range & Units (hover) 4/11/25 1200 3/13/25 0951 11/20/24 1549 5/7/24 1505 2/26/24 1222 11/7/23 0711 2/20/23 0933   Color, UA Colorless Colorless Light Yellow yellow Yellow Light Yellow Colorless   Clarity, UA Clear Clear Clear clear Clear Clear Clear   Specific Gravity, UA 1.005 1.006 1.020 R 1,005 R 1.020 R 1.020 R 1.007   pH, UA 6.5 7.0 6.5 6.0 R 7.0 R 6.0 R 6.5   Leukocytes, UA Negative Moderate Abnormal  Negative - R Negative Negative Negative   Nitrite, UA Negative Negative Negative - R Negative Negative Negative   Protein, UA Negative Negative Negative - R Negative Negative Negative   Glucose, UA Negative Negative Negative - R Negative Negative Negative   Ketones, UA Negative Negative Negative - R Negative Negative Negative   Urobilinogen, UA <2.0 <2.0 <2.0    <2.0   Bilirubin, UA Negative Negative Negative  Negative Negative Negative   Occult Blood, UA Trace Abnormal  Small Abnormal  Small Abnormal  +++ R Large Abnormal  Moderate Abnormal  Small Abnormal    RBC, UA None Seen None Seen 4-10 Abnormal  R  4-10 Abnormal  R 4-10 Abnormal  R None Seen   WBC, UA None Seen 20-30 Abnormal  None Seen R  1-2 Abnormal  R 0-1 Abnormal  R 1-2   Epithelial Cells None Seen Occasional Occasional  Occasional Moderate Abnormal  None Seen   Bacteria, UA Occasional None Seen Occasional  Occasional Moderate Abnormal  None Seen   SL  AMB POCT UROBILINOGEN    0.2 0.2 R 0.2 R    BILIRUBIN,UA

## 2025-04-15 ENCOUNTER — RESULTS FOLLOW-UP (OUTPATIENT)
Dept: UROLOGY | Facility: CLINIC | Age: 65
End: 2025-04-15

## 2025-04-15 NOTE — TELEPHONE ENCOUNTER
LM for PT that her urine looked clear. Showed no signs of infection. If having urinary issues can arrange for a sooner follow-up.       ---- Message from Shiva Melara PA-C sent at 4/15/2025  9:17 AM EDT -----  Regarding: Urine  Let patient know her urine looked clear.  Showed no signs of infection.  If having urinary issues should not range her routine follow-up  ----- Message -----  From: Lab, Background User  Sent: 4/11/2025   7:41 PM EDT  To: Shiva Melara PA-C

## 2025-04-29 ENCOUNTER — TELEPHONE (OUTPATIENT)
Age: 65
End: 2025-04-29

## 2025-04-29 NOTE — TELEPHONE ENCOUNTER
Pt calling in regards to medication question. Pt has been taking solifenacin (VESICARE) 10mg tablet and sts she believes it may be causing heart palpitations.     Medication is discontinued in chart. Please review.     Pt CB- [phone]

## 2025-04-29 NOTE — TELEPHONE ENCOUNTER
Stopped taking the Vesicare for 2 days and still having the palpitations    B/P last night 74/41 HR 85, today 108/75 HR 85    Advised to continue  to stop Vesicare until Urologist says otherwise and to call Cardiologist with B/P, HR and palpation concerns, patient agreeable

## 2025-04-30 NOTE — TELEPHONE ENCOUNTER
Shiva Melara PA-C  [facility] [place] [facility]1 hour ago (8:21 AM)     EZ  Patient needs to stay off her Vesicare until she has her follow-up in June.  I have any issues prior to that not okay to move her appointment up sooner.  I do agree she should follow-up with cardiology and see if Vesicare was causing the issue or if there was something else underlying.       Called and spoke to pt. Shiva's message relayed. Pt verbalized understanding and will stop Vesicare. Advised pt to f/u with cardiology and to call our office if she needs a sooner f/u.

## 2025-06-03 ENCOUNTER — TELEPHONE (OUTPATIENT)
Age: 65
End: 2025-06-03

## 2025-06-03 NOTE — TELEPHONE ENCOUNTER
Ramírez from [facility] called in regard to patient's last colonoscopy and endoscopy results. The order day was 7/7/202 and it was performed on 2/5/2021. Please fax procedure notes and biopsy results to:  [phone]

## 2025-06-10 ENCOUNTER — OFFICE VISIT (OUTPATIENT)
Dept: UROLOGY | Facility: CLINIC | Age: 65
End: 2025-06-10
Payer: COMMERCIAL

## 2025-06-10 VITALS
OXYGEN SATURATION: 98 % | WEIGHT: 158 LBS | DIASTOLIC BLOOD PRESSURE: 64 MMHG | HEIGHT: 66 IN | SYSTOLIC BLOOD PRESSURE: 106 MMHG | HEART RATE: 63 BPM | BODY MASS INDEX: 25.39 KG/M2

## 2025-06-10 DIAGNOSIS — R39.9 UTI SYMPTOMS: Primary | ICD-10-CM

## 2025-06-10 PROCEDURE — 99213 OFFICE O/P EST LOW 20 MIN: CPT | Performed by: UROLOGY

## 2025-06-10 RX ORDER — SULFAMETHOXAZOLE AND TRIMETHOPRIM 800; 160 MG/1; MG/1
1 TABLET ORAL EVERY 12 HOURS SCHEDULED
Qty: 20 TABLET | Refills: 0 | Status: SHIPPED | OUTPATIENT
Start: 2025-06-10 | End: 2025-06-20

## 2025-06-10 NOTE — PROGRESS NOTES
Jayda Clayton is a(n) 65 y.o. female. , :  1960    Subjective     Assessment:  The encounter diagnosis was UTI symptoms.  65 F azo bladder control pills and other OTC meds and stopped coffee. Never saw gyn about implants.  Tried solifenacin but heart palpitations.  Had one UTI gary in March took bactrim DS helped.    Plan: Hold off solifenacin unless symptoms recur. Bactrim DS in case gets a uti.     Radiology  24 CT Renal  1.  No suspicious renal mass or upper tract urothelial lesion.  2.  Urinary bladder without focal wall mass or calculus.  3.  Additional findings discussed above.    2020 CT abdomen pelvis renal protocol  normal      History  Atrophic vaginitis.  History of vestibulitis and advised estrogen cream.  Microscopic hematuria.  Had evaluation [facility] in  and  with no suspicious findings. Ex smoker  History of endometriosis with hysterectomy removal for ectomy.  Postmenopausal mention of interstitial cystitis for which she takes Azo flareups.  Frequency and Urgency. No desire for meds 2024. Solifenacin seems to help.     Prior Visits  2023 [facility]  Hx of  Microscopic hematuria. Followed with urogyn through [facility]. Cystoscopy  negative for an  abnormalities. Prior hematuria workup .   CT renal (10/22/2020) mild right renal fullness.      Prior hx of IC and atrophic vaginitis.   Hx of hysterectomy + b/l oophrectomy d/t endometriosis. Does believe she had a bladder sling placed.    Past history of tobacco use, approximate 15 pack year hx. Quit a number of years ago.      Previously reported increasing dysuria and pelvic discomfort. No gross hematuria. Started on estrace with improvement.     When she gets a flair of symptoms, she'll take AZO bladder control with good symptom management.   Has occasional need to double void and post void dribbling.      UA (2023) negative for microscopic hematuria.     2024 OV  "Deny  63-year-old woman with a complicated history of persistent microscopic hematuria with negative workup, postmenopausal atrophic vaginitis, urinary frequency and urgency and sense of urinary infections.  She self medicates with Azo.  She has d-mannose pills which are not sure how often she takes.  She never has been evaluated for pelvic floor abnormalities despite her prior gynecologic surgery with what is reported as a bladder sling.  Maybe she has intense floor gives her all of her symptoms.  I do not see very many urinary infections in her history.  She still needs a cystoscopy because of the hematuria as her last scope was in 2019.  Plan: Cystoscopy, send in standing order for urine culture.  Will send her urine for culture today.  Leukocytes was negative but red cells were present.  I just want to prove that she does not need antibiotics currently.  She was offered Vesicare but really does not want to go on pills.  She would prefer to just go frequently.     5/9/2024 OV Deny  Here for cystoscopy.  Now is willing to try overactive bladder medicine.  She is concerned that she might have endometrial implants.  Advised to talk to gynecology about that.    6/10/2025 OV Song Barclay  65 F azo bladder control pills and other OTC meds and stopped coffee. Never saw gyn about implants.  Tried solifenacin but heart palpitations.  Had one UTI morganella in March took bactrim DS helped.    Plan: Hold off solifenacin unless symptoms recur. Bactrim DS in case gets a uti.    Review of Systems    No results found for: \"PSA\"  No results found for: \"TESTOSTERONE\"  No components found for: \"CR\"  No results found for: \"HBA1C\"    Objective     /64 (BP Location: Left arm, Patient Position: Sitting, Cuff Size: Large)   Pulse 63   Ht 5' 6\" (1.676 m)   Wt 71.7 kg (158 lb)   SpO2 98%   BMI 25.50 kg/m²     Physical Exam      Song Barclay, [facility]  "

## 2025-06-23 DIAGNOSIS — R39.15 URGENCY OF URINATION: ICD-10-CM

## 2025-06-23 RX ORDER — SOLIFENACIN SUCCINATE 10 MG/1
10 TABLET, FILM COATED ORAL DAILY
Qty: 30 TABLET | Refills: 5 | Status: SHIPPED | OUTPATIENT
Start: 2025-06-23

## (undated) DEVICE — GLOVE SRG BIOGEL 7.5

## (undated) DEVICE — PACK GENERAL LF

## (undated) DEVICE — BASIC DOUBLE BASIN 2-LF: Brand: MEDLINE INDUSTRIES, INC.

## (undated) DEVICE — DRAPE SHEET THREE QUARTER

## (undated) DEVICE — BRUSH EZ SCRUB PCMX W/NAIL CLEANER

## (undated) DEVICE — 3M™ STERI-DRAPE™ U-DRAPE 1015: Brand: STERI-DRAPE™

## (undated) DEVICE — DISPOSABLE EQUIPMENT COVER: Brand: SMALL TOWEL DRAPE

## (undated) DEVICE — STOCKINETTE REGULAR

## (undated) DEVICE — STRETCH BANDAGE: Brand: CURITY

## (undated) DEVICE — SPONGE SCRUB 4 PCT CHLORHEXIDINE

## (undated) DEVICE — SUT ETHILON 5-0 P-3 18 IN 698H

## (undated) DEVICE — CURITY NON-ADHERENT STRIPS: Brand: CURITY

## (undated) DEVICE — ASTOUND STANDARD SURGICAL GOWN, XL: Brand: CONVERTORS

## (undated) DEVICE — INTENDED FOR TISSUE SEPARATION, AND OTHER PROCEDURES THAT REQUIRE A SHARP SURGICAL BLADE TO PUNCTURE OR CUT.: Brand: BARD-PARKER SAFETY BLADES SIZE 15, STERILE

## (undated) DEVICE — SATINCRESCENT® KNIFE STRAIGHT: Brand: SATINCRESCENT®

## (undated) DEVICE — SPONGE GAUZE 4 X 8 12 PLY STRL LF

## (undated) DEVICE — CUFF TOURNIQUET 18 X 4 IN QUICK CONNECT DISP 1 BLADDER

## (undated) DEVICE — SYRINGE 10ML LL CONTROL TOP

## (undated) DEVICE — COBAN 2 IN UNSTERILE

## (undated) DEVICE — ARM SLING: Brand: DEROYAL

## (undated) DEVICE — TIBURON HAND DRAPE: Brand: CONVERTORS

## (undated) DEVICE — SUT VICRYL 4-0 P-3 18 IN J494G

## (undated) DEVICE — COBAN 4 IN STERILE

## (undated) DEVICE — PAD CAST 4 IN COTTON NON STERILE

## (undated) DEVICE — MONOJECT NEEDLES 27 GA SHORT METAL HUB

## (undated) DEVICE — ACE WRAP 3 IN UNSTERILE

## (undated) DEVICE — SUT VICRYL 4-0 RAPIDE VR496

## (undated) DEVICE — GLOVE INDICATOR PI UNDERGLOVE SZ 7.5 BLUE

## (undated) DEVICE — BANDAGE, ESMARK LF STR 4"X9'(20/CS): Brand: CYPRESS

## (undated) DEVICE — NEEDLE 25G X 1 1/2